# Patient Record
Sex: MALE | Race: WHITE | NOT HISPANIC OR LATINO | Employment: FULL TIME | RURAL
[De-identification: names, ages, dates, MRNs, and addresses within clinical notes are randomized per-mention and may not be internally consistent; named-entity substitution may affect disease eponyms.]

---

## 2021-05-25 ENCOUNTER — OFFICE VISIT (OUTPATIENT)
Dept: FAMILY MEDICINE | Facility: CLINIC | Age: 66
End: 2021-05-25
Payer: COMMERCIAL

## 2021-05-25 VITALS
BODY MASS INDEX: 29.73 KG/M2 | OXYGEN SATURATION: 98 % | SYSTOLIC BLOOD PRESSURE: 110 MMHG | TEMPERATURE: 97 F | HEART RATE: 68 BPM | HEIGHT: 63 IN | DIASTOLIC BLOOD PRESSURE: 72 MMHG | WEIGHT: 167.81 LBS | RESPIRATION RATE: 20 BRPM

## 2021-05-25 DIAGNOSIS — Z12.5 PROSTATE CANCER SCREENING: ICD-10-CM

## 2021-05-25 DIAGNOSIS — E11.65 TYPE 2 DIABETES MELLITUS WITH HYPERGLYCEMIA, WITHOUT LONG-TERM CURRENT USE OF INSULIN: Primary | ICD-10-CM

## 2021-05-25 DIAGNOSIS — Z23 NEED FOR DIPHTHERIA-TETANUS-PERTUSSIS (TDAP) VACCINE: ICD-10-CM

## 2021-05-25 DIAGNOSIS — Z79.899 ENCOUNTER FOR LONG-TERM (CURRENT) USE OF OTHER MEDICATIONS: ICD-10-CM

## 2021-05-25 DIAGNOSIS — I10 ESSENTIAL HYPERTENSION: ICD-10-CM

## 2021-05-25 DIAGNOSIS — Z11.59 NEED FOR HEPATITIS C SCREENING TEST: ICD-10-CM

## 2021-05-25 PROCEDURE — 90471 TDAP VACCINE GREATER THAN OR EQUAL TO 7YO IM: ICD-10-PCS | Mod: ,,, | Performed by: NURSE PRACTITIONER

## 2021-05-25 PROCEDURE — 90471 IMMUNIZATION ADMIN: CPT | Mod: ,,, | Performed by: NURSE PRACTITIONER

## 2021-05-25 PROCEDURE — 99214 PR OFFICE/OUTPT VISIT, EST, LEVL IV, 30-39 MIN: ICD-10-PCS | Mod: 25,,, | Performed by: NURSE PRACTITIONER

## 2021-05-25 PROCEDURE — 90715 TDAP VACCINE GREATER THAN OR EQUAL TO 7YO IM: ICD-10-PCS | Mod: ,,, | Performed by: NURSE PRACTITIONER

## 2021-05-25 PROCEDURE — 99214 OFFICE O/P EST MOD 30 MIN: CPT | Mod: 25,,, | Performed by: NURSE PRACTITIONER

## 2021-05-25 PROCEDURE — 90715 TDAP VACCINE 7 YRS/> IM: CPT | Mod: ,,, | Performed by: NURSE PRACTITIONER

## 2021-05-25 RX ORDER — LISINOPRIL 20 MG/1
20 TABLET ORAL DAILY
COMMUNITY
Start: 2021-05-11 | End: 2021-08-05

## 2021-05-25 RX ORDER — DAPAGLIFLOZIN AND METFORMIN HYDROCHLORIDE 5; 1000 MG/1; MG/1
2 TABLET, FILM COATED, EXTENDED RELEASE ORAL EVERY MORNING
COMMUNITY
Start: 2021-03-31 | End: 2021-05-25 | Stop reason: SDUPTHER

## 2021-05-25 RX ORDER — DAPAGLIFLOZIN AND METFORMIN HYDROCHLORIDE 5; 1000 MG/1; MG/1
2 TABLET, FILM COATED, EXTENDED RELEASE ORAL EVERY MORNING
Qty: 180 TABLET | Refills: 1 | Status: SHIPPED | OUTPATIENT
Start: 2021-05-25 | End: 2021-09-28

## 2021-07-20 ENCOUNTER — OFFICE VISIT (OUTPATIENT)
Dept: FAMILY MEDICINE | Facility: CLINIC | Age: 66
End: 2021-07-20
Payer: COMMERCIAL

## 2021-07-20 VITALS
RESPIRATION RATE: 20 BRPM | HEART RATE: 80 BPM | TEMPERATURE: 98 F | WEIGHT: 173.81 LBS | OXYGEN SATURATION: 98 % | DIASTOLIC BLOOD PRESSURE: 74 MMHG | HEIGHT: 63 IN | BODY MASS INDEX: 30.8 KG/M2 | SYSTOLIC BLOOD PRESSURE: 112 MMHG

## 2021-07-20 DIAGNOSIS — R07.81 RIB PAIN ON LEFT SIDE: Primary | ICD-10-CM

## 2021-07-20 DIAGNOSIS — S20.20XA: ICD-10-CM

## 2021-07-20 PROCEDURE — 99212 PR OFFICE/OUTPT VISIT, EST, LEVL II, 10-19 MIN: ICD-10-PCS | Mod: ,,, | Performed by: NURSE PRACTITIONER

## 2021-07-20 PROCEDURE — 99212 OFFICE O/P EST SF 10 MIN: CPT | Mod: ,,, | Performed by: NURSE PRACTITIONER

## 2021-08-20 ENCOUNTER — OFFICE VISIT (OUTPATIENT)
Dept: FAMILY MEDICINE | Facility: CLINIC | Age: 66
End: 2021-08-20
Payer: COMMERCIAL

## 2021-08-20 VITALS — HEIGHT: 63 IN | BODY MASS INDEX: 30.65 KG/M2 | WEIGHT: 173 LBS | HEART RATE: 94 BPM | OXYGEN SATURATION: 96 %

## 2021-08-20 DIAGNOSIS — Z20.822 EXPOSURE TO COVID-19 VIRUS: ICD-10-CM

## 2021-08-20 DIAGNOSIS — R63.0 LOSS OF APPETITE: ICD-10-CM

## 2021-08-20 DIAGNOSIS — J02.9 SORE THROAT: ICD-10-CM

## 2021-08-20 DIAGNOSIS — R09.81 NASAL CONGESTION: ICD-10-CM

## 2021-08-20 DIAGNOSIS — R51.9 NONINTRACTABLE HEADACHE, UNSPECIFIED CHRONICITY PATTERN, UNSPECIFIED HEADACHE TYPE: ICD-10-CM

## 2021-08-20 DIAGNOSIS — R05.9 COUGH: ICD-10-CM

## 2021-08-20 DIAGNOSIS — U07.1 COVID-19: Primary | ICD-10-CM

## 2021-08-20 DIAGNOSIS — R52 BODY ACHES: ICD-10-CM

## 2021-08-20 DIAGNOSIS — R68.83 CHILLS: ICD-10-CM

## 2021-08-20 LAB
CTP QC/QA: YES
CTP QC/QA: YES
S PYO RRNA THROAT QL PROBE: NEGATIVE
SARS-COV-2 AG RESP QL IA.RAPID: POSITIVE

## 2021-08-20 PROCEDURE — 3008F PR BODY MASS INDEX (BMI) DOCUMENTED: ICD-10-PCS | Mod: ,,, | Performed by: NURSE PRACTITIONER

## 2021-08-20 PROCEDURE — 99213 PR OFFICE/OUTPT VISIT, EST, LEVL III, 20-29 MIN: ICD-10-PCS | Mod: ,,, | Performed by: NURSE PRACTITIONER

## 2021-08-20 PROCEDURE — 3044F PR MOST RECENT HEMOGLOBIN A1C LEVEL <7.0%: ICD-10-PCS | Mod: ,,, | Performed by: NURSE PRACTITIONER

## 2021-08-20 PROCEDURE — 1159F PR MEDICATION LIST DOCUMENTED IN MEDICAL RECORD: ICD-10-PCS | Mod: ,,, | Performed by: NURSE PRACTITIONER

## 2021-08-20 PROCEDURE — 1159F MED LIST DOCD IN RCRD: CPT | Mod: ,,, | Performed by: NURSE PRACTITIONER

## 2021-08-20 PROCEDURE — 87880 STREP A ASSAY W/OPTIC: CPT | Mod: QW,,, | Performed by: NURSE PRACTITIONER

## 2021-08-20 PROCEDURE — 3044F HG A1C LEVEL LT 7.0%: CPT | Mod: ,,, | Performed by: NURSE PRACTITIONER

## 2021-08-20 PROCEDURE — 99213 OFFICE O/P EST LOW 20 MIN: CPT | Mod: ,,, | Performed by: NURSE PRACTITIONER

## 2021-08-20 PROCEDURE — 87880 POCT RAPID STREP A: ICD-10-PCS | Mod: QW,,, | Performed by: NURSE PRACTITIONER

## 2021-08-20 PROCEDURE — 1160F RVW MEDS BY RX/DR IN RCRD: CPT | Mod: ,,, | Performed by: NURSE PRACTITIONER

## 2021-08-20 PROCEDURE — 87426 SARSCOV CORONAVIRUS AG IA: CPT | Mod: QW,,, | Performed by: NURSE PRACTITIONER

## 2021-08-20 PROCEDURE — 87426 SARS CORONAVIRUS 2 ANTIGEN POCT: ICD-10-PCS | Mod: QW,,, | Performed by: NURSE PRACTITIONER

## 2021-08-20 PROCEDURE — 1160F PR REVIEW ALL MEDS BY PRESCRIBER/CLIN PHARMACIST DOCUMENTED: ICD-10-PCS | Mod: ,,, | Performed by: NURSE PRACTITIONER

## 2021-08-20 PROCEDURE — 3008F BODY MASS INDEX DOCD: CPT | Mod: ,,, | Performed by: NURSE PRACTITIONER

## 2021-08-21 ENCOUNTER — INFUSION (OUTPATIENT)
Dept: INFECTIOUS DISEASES | Facility: HOSPITAL | Age: 66
End: 2021-08-21
Attending: PSYCHOLOGIST
Payer: COMMERCIAL

## 2021-08-21 VITALS
TEMPERATURE: 98 F | SYSTOLIC BLOOD PRESSURE: 114 MMHG | OXYGEN SATURATION: 97 % | RESPIRATION RATE: 16 BRPM | DIASTOLIC BLOOD PRESSURE: 75 MMHG | HEART RATE: 97 BPM

## 2021-08-21 DIAGNOSIS — U07.1 COVID-19: Primary | ICD-10-CM

## 2021-08-21 PROCEDURE — 63600175 PHARM REV CODE 636 W HCPCS: Performed by: NURSE PRACTITIONER

## 2021-08-21 PROCEDURE — M0243 CASIRIVI AND IMDEVI INFUSION: HCPCS | Performed by: NURSE PRACTITIONER

## 2021-08-21 PROCEDURE — 25000003 PHARM REV CODE 250: Performed by: NURSE PRACTITIONER

## 2021-08-21 RX ORDER — SODIUM CHLORIDE 0.9 % (FLUSH) 0.9 %
10 SYRINGE (ML) INJECTION
Status: DISCONTINUED | OUTPATIENT
Start: 2021-08-21 | End: 2021-09-28 | Stop reason: ALTCHOICE

## 2021-08-21 RX ORDER — DIPHENHYDRAMINE HYDROCHLORIDE 50 MG/ML
25 INJECTION INTRAMUSCULAR; INTRAVENOUS ONCE AS NEEDED
Status: DISCONTINUED | OUTPATIENT
Start: 2021-08-21 | End: 2021-09-28 | Stop reason: ALTCHOICE

## 2021-08-21 RX ORDER — ALBUTEROL SULFATE 90 UG/1
2 AEROSOL, METERED RESPIRATORY (INHALATION)
Status: DISCONTINUED | OUTPATIENT
Start: 2021-08-21 | End: 2021-09-28 | Stop reason: ALTCHOICE

## 2021-08-21 RX ORDER — ACETAMINOPHEN 325 MG/1
650 TABLET ORAL ONCE AS NEEDED
Status: DISCONTINUED | OUTPATIENT
Start: 2021-08-21 | End: 2021-09-28 | Stop reason: ALTCHOICE

## 2021-08-21 RX ORDER — ONDANSETRON 4 MG/1
4 TABLET, ORALLY DISINTEGRATING ORAL ONCE AS NEEDED
Status: DISCONTINUED | OUTPATIENT
Start: 2021-08-21 | End: 2021-09-28 | Stop reason: ALTCHOICE

## 2021-08-21 RX ORDER — EPINEPHRINE 0.3 MG/.3ML
0.3 INJECTION SUBCUTANEOUS
Status: DISCONTINUED | OUTPATIENT
Start: 2021-08-21 | End: 2021-09-28 | Stop reason: ALTCHOICE

## 2021-08-21 RX ADMIN — CASIRIVIMAB AND IMDEVIMAB 600 MG: 600; 600 INJECTION, SOLUTION, CONCENTRATE INTRAVENOUS at 09:08

## 2021-08-24 ENCOUNTER — TELEPHONE (OUTPATIENT)
Dept: FAMILY MEDICINE | Facility: CLINIC | Age: 66
End: 2021-08-24

## 2021-09-28 ENCOUNTER — OFFICE VISIT (OUTPATIENT)
Dept: FAMILY MEDICINE | Facility: CLINIC | Age: 66
End: 2021-09-28
Payer: COMMERCIAL

## 2021-09-28 ENCOUNTER — TELEPHONE (OUTPATIENT)
Dept: FAMILY MEDICINE | Facility: CLINIC | Age: 66
End: 2021-09-28

## 2021-09-28 VITALS
OXYGEN SATURATION: 97 % | TEMPERATURE: 97 F | HEIGHT: 63 IN | BODY MASS INDEX: 29.66 KG/M2 | DIASTOLIC BLOOD PRESSURE: 70 MMHG | RESPIRATION RATE: 20 BRPM | HEART RATE: 77 BPM | SYSTOLIC BLOOD PRESSURE: 110 MMHG | WEIGHT: 167.38 LBS

## 2021-09-28 DIAGNOSIS — I10 ESSENTIAL HYPERTENSION: ICD-10-CM

## 2021-09-28 DIAGNOSIS — E11.65 TYPE 2 DIABETES MELLITUS WITH HYPERGLYCEMIA, WITHOUT LONG-TERM CURRENT USE OF INSULIN: ICD-10-CM

## 2021-09-28 DIAGNOSIS — Z13.1 DIABETES MELLITUS SCREENING: ICD-10-CM

## 2021-09-28 DIAGNOSIS — Z13.220 SCREENING FOR HYPERLIPIDEMIA: ICD-10-CM

## 2021-09-28 DIAGNOSIS — Z86.16 PERSONAL HISTORY OF COVID-19: ICD-10-CM

## 2021-09-28 DIAGNOSIS — Z79.899 ENCOUNTER FOR LONG-TERM (CURRENT) USE OF OTHER MEDICATIONS: ICD-10-CM

## 2021-09-28 DIAGNOSIS — Z00.00 ROUTINE GENERAL MEDICAL EXAMINATION AT A HEALTH CARE FACILITY: Primary | ICD-10-CM

## 2021-09-28 LAB
ANION GAP SERPL CALCULATED.3IONS-SCNC: 13 MMOL/L (ref 7–16)
BUN SERPL-MCNC: 27 MG/DL (ref 7–18)
BUN/CREAT SERPL: 22 (ref 6–20)
CALCIUM SERPL-MCNC: 9.7 MG/DL (ref 8.5–10.1)
CHLORIDE SERPL-SCNC: 108 MMOL/L (ref 98–107)
CHOLEST SERPL-MCNC: 144 MG/DL (ref 0–200)
CHOLEST/HDLC SERPL: 3.3 {RATIO}
CO2 SERPL-SCNC: 22 MMOL/L (ref 21–32)
CREAT SERPL-MCNC: 1.25 MG/DL (ref 0.7–1.3)
EST. AVERAGE GLUCOSE BLD GHB EST-MCNC: 134 MG/DL
GLUCOSE SERPL-MCNC: 105 MG/DL (ref 74–106)
HBA1C MFR BLD HPLC: 6.6 % (ref 4.5–6.6)
HDLC SERPL-MCNC: 43 MG/DL (ref 40–60)
LDLC SERPL CALC-MCNC: 83 MG/DL
LDLC/HDLC SERPL: 1.9 {RATIO}
NONHDLC SERPL-MCNC: 101 MG/DL
POTASSIUM SERPL-SCNC: 4 MMOL/L (ref 3.5–5.1)
SODIUM SERPL-SCNC: 139 MMOL/L (ref 136–145)
TRIGL SERPL-MCNC: 90 MG/DL (ref 35–150)
VLDLC SERPL-MCNC: 18 MG/DL

## 2021-09-28 PROCEDURE — 80061 LIPID PANEL: ICD-10-PCS | Mod: ,,, | Performed by: CLINICAL MEDICAL LABORATORY

## 2021-09-28 PROCEDURE — 3074F PR MOST RECENT SYSTOLIC BLOOD PRESSURE < 130 MM HG: ICD-10-PCS | Mod: ,,, | Performed by: NURSE PRACTITIONER

## 2021-09-28 PROCEDURE — 80048 BASIC METABOLIC PNL TOTAL CA: CPT | Mod: ,,, | Performed by: CLINICAL MEDICAL LABORATORY

## 2021-09-28 PROCEDURE — 3074F SYST BP LT 130 MM HG: CPT | Mod: ,,, | Performed by: NURSE PRACTITIONER

## 2021-09-28 PROCEDURE — 99396 PR PREVENTIVE VISIT,EST,40-64: ICD-10-PCS | Mod: ,,, | Performed by: NURSE PRACTITIONER

## 2021-09-28 PROCEDURE — 80061 LIPID PANEL: CPT | Mod: ,,, | Performed by: CLINICAL MEDICAL LABORATORY

## 2021-09-28 PROCEDURE — 3008F BODY MASS INDEX DOCD: CPT | Mod: ,,, | Performed by: NURSE PRACTITIONER

## 2021-09-28 PROCEDURE — 3061F PR NEG MICROALBUMINURIA RESULT DOCUMENTED/REVIEW: ICD-10-PCS | Mod: ,,, | Performed by: NURSE PRACTITIONER

## 2021-09-28 PROCEDURE — 83036 HEMOGLOBIN GLYCOSYLATED A1C: CPT | Mod: ,,, | Performed by: CLINICAL MEDICAL LABORATORY

## 2021-09-28 PROCEDURE — 3044F PR MOST RECENT HEMOGLOBIN A1C LEVEL <7.0%: ICD-10-PCS | Mod: ,,, | Performed by: NURSE PRACTITIONER

## 2021-09-28 PROCEDURE — 3066F NEPHROPATHY DOC TX: CPT | Mod: ,,, | Performed by: NURSE PRACTITIONER

## 2021-09-28 PROCEDURE — 3066F PR DOCUMENTATION OF TREATMENT FOR NEPHROPATHY: ICD-10-PCS | Mod: ,,, | Performed by: NURSE PRACTITIONER

## 2021-09-28 PROCEDURE — 80048 BASIC METABOLIC PANEL: ICD-10-PCS | Mod: ,,, | Performed by: CLINICAL MEDICAL LABORATORY

## 2021-09-28 PROCEDURE — 3044F HG A1C LEVEL LT 7.0%: CPT | Mod: ,,, | Performed by: NURSE PRACTITIONER

## 2021-09-28 PROCEDURE — 3008F PR BODY MASS INDEX (BMI) DOCUMENTED: ICD-10-PCS | Mod: ,,, | Performed by: NURSE PRACTITIONER

## 2021-09-28 PROCEDURE — 3061F NEG MICROALBUMINURIA REV: CPT | Mod: ,,, | Performed by: NURSE PRACTITIONER

## 2021-09-28 PROCEDURE — 1159F MED LIST DOCD IN RCRD: CPT | Mod: ,,, | Performed by: NURSE PRACTITIONER

## 2021-09-28 PROCEDURE — 4010F PR ACE/ARB THEARPY RXD/TAKEN: ICD-10-PCS | Mod: ,,, | Performed by: NURSE PRACTITIONER

## 2021-09-28 PROCEDURE — 3078F DIAST BP <80 MM HG: CPT | Mod: ,,, | Performed by: NURSE PRACTITIONER

## 2021-09-28 PROCEDURE — 99396 PREV VISIT EST AGE 40-64: CPT | Mod: ,,, | Performed by: NURSE PRACTITIONER

## 2021-09-28 PROCEDURE — 3078F PR MOST RECENT DIASTOLIC BLOOD PRESSURE < 80 MM HG: ICD-10-PCS | Mod: ,,, | Performed by: NURSE PRACTITIONER

## 2021-09-28 PROCEDURE — 1159F PR MEDICATION LIST DOCUMENTED IN MEDICAL RECORD: ICD-10-PCS | Mod: ,,, | Performed by: NURSE PRACTITIONER

## 2021-09-28 PROCEDURE — 4010F ACE/ARB THERAPY RXD/TAKEN: CPT | Mod: ,,, | Performed by: NURSE PRACTITIONER

## 2021-09-28 PROCEDURE — 83036 HEMOGLOBIN A1C: ICD-10-PCS | Mod: ,,, | Performed by: CLINICAL MEDICAL LABORATORY

## 2021-09-28 RX ORDER — PRAVASTATIN SODIUM 10 MG/1
10 TABLET ORAL DAILY
Qty: 90 TABLET | Refills: 3 | Status: SHIPPED | OUTPATIENT
Start: 2021-09-28 | End: 2022-05-24 | Stop reason: SDUPTHER

## 2021-09-28 RX ORDER — LISINOPRIL 20 MG/1
20 TABLET ORAL DAILY
Qty: 90 TABLET | Refills: 1 | Status: SHIPPED | OUTPATIENT
Start: 2021-09-28 | End: 2022-02-16

## 2021-09-28 RX ORDER — EMPAGLIFLOZIN, METFORMIN HYDROCHLORIDE 12.5; 1 MG/1; MG/1
2 TABLET, EXTENDED RELEASE ORAL DAILY
Qty: 180 TABLET | Refills: 1 | Status: SHIPPED | OUTPATIENT
Start: 2021-09-28 | End: 2021-09-29

## 2021-09-28 RX ORDER — DAPAGLIFLOZIN AND METFORMIN HYDROCHLORIDE 5; 1000 MG/1; MG/1
2 TABLET, FILM COATED, EXTENDED RELEASE ORAL EVERY MORNING
Qty: 180 TABLET | Refills: 1 | Status: CANCELLED | OUTPATIENT
Start: 2021-09-28

## 2021-09-29 ENCOUNTER — TELEPHONE (OUTPATIENT)
Dept: FAMILY MEDICINE | Facility: CLINIC | Age: 66
End: 2021-09-29

## 2021-09-29 DIAGNOSIS — E11.65 TYPE 2 DIABETES MELLITUS WITH HYPERGLYCEMIA, WITHOUT LONG-TERM CURRENT USE OF INSULIN: Primary | ICD-10-CM

## 2021-09-29 RX ORDER — METFORMIN HYDROCHLORIDE 500 MG/1
2000 TABLET, EXTENDED RELEASE ORAL
Qty: 360 TABLET | Refills: 3 | Status: SHIPPED | OUTPATIENT
Start: 2021-09-29 | End: 2022-04-30 | Stop reason: CLARIF

## 2021-09-29 RX ORDER — EMPAGLIFLOZIN 25 MG/1
25 TABLET, FILM COATED ORAL DAILY
Qty: 90 TABLET | Refills: 3 | Status: SHIPPED | OUTPATIENT
Start: 2021-09-29 | End: 2022-04-30 | Stop reason: CLARIF

## 2022-01-11 ENCOUNTER — OFFICE VISIT (OUTPATIENT)
Dept: FAMILY MEDICINE | Facility: CLINIC | Age: 67
End: 2022-01-11
Payer: COMMERCIAL

## 2022-01-11 VITALS
TEMPERATURE: 97 F | SYSTOLIC BLOOD PRESSURE: 120 MMHG | HEART RATE: 97 BPM | BODY MASS INDEX: 31.01 KG/M2 | RESPIRATION RATE: 20 BRPM | OXYGEN SATURATION: 99 % | WEIGHT: 175 LBS | DIASTOLIC BLOOD PRESSURE: 78 MMHG | HEIGHT: 63 IN

## 2022-01-11 DIAGNOSIS — Z23 NEED FOR PNEUMOCOCCAL VACCINE: ICD-10-CM

## 2022-01-11 DIAGNOSIS — Z79.899 ENCOUNTER FOR LONG-TERM (CURRENT) USE OF OTHER MEDICATIONS: ICD-10-CM

## 2022-01-11 DIAGNOSIS — E11.65 TYPE 2 DIABETES MELLITUS WITH HYPERGLYCEMIA, WITHOUT LONG-TERM CURRENT USE OF INSULIN: Primary | ICD-10-CM

## 2022-01-11 DIAGNOSIS — I10 PRIMARY HYPERTENSION: ICD-10-CM

## 2022-01-11 LAB
ANION GAP SERPL CALCULATED.3IONS-SCNC: 10 MMOL/L (ref 7–16)
BUN SERPL-MCNC: 21 MG/DL (ref 7–18)
BUN/CREAT SERPL: 18 (ref 6–20)
CALCIUM SERPL-MCNC: 9 MG/DL (ref 8.5–10.1)
CHLORIDE SERPL-SCNC: 106 MMOL/L (ref 98–107)
CO2 SERPL-SCNC: 24 MMOL/L (ref 21–32)
CREAT SERPL-MCNC: 1.2 MG/DL (ref 0.7–1.3)
EST. AVERAGE GLUCOSE BLD GHB EST-MCNC: 194 MG/DL
GLUCOSE SERPL-MCNC: 209 MG/DL (ref 74–106)
HBA1C MFR BLD HPLC: 8.4 % (ref 4.5–6.6)
POTASSIUM SERPL-SCNC: 4.2 MMOL/L (ref 3.5–5.1)
SODIUM SERPL-SCNC: 136 MMOL/L (ref 136–145)

## 2022-01-11 PROCEDURE — 3074F SYST BP LT 130 MM HG: CPT | Mod: ,,, | Performed by: NURSE PRACTITIONER

## 2022-01-11 PROCEDURE — 3074F PR MOST RECENT SYSTOLIC BLOOD PRESSURE < 130 MM HG: ICD-10-PCS | Mod: ,,, | Performed by: NURSE PRACTITIONER

## 2022-01-11 PROCEDURE — 90471 IMMUNIZATION ADMIN: CPT | Mod: ,,, | Performed by: NURSE PRACTITIONER

## 2022-01-11 PROCEDURE — 1126F PR PAIN SEVERITY QUANTIFIED, NO PAIN PRESENT: ICD-10-PCS | Mod: ,,, | Performed by: NURSE PRACTITIONER

## 2022-01-11 PROCEDURE — 3008F BODY MASS INDEX DOCD: CPT | Mod: ,,, | Performed by: NURSE PRACTITIONER

## 2022-01-11 PROCEDURE — 1159F MED LIST DOCD IN RCRD: CPT | Mod: ,,, | Performed by: NURSE PRACTITIONER

## 2022-01-11 PROCEDURE — 83036 HEMOGLOBIN A1C: ICD-10-PCS | Mod: ,,, | Performed by: CLINICAL MEDICAL LABORATORY

## 2022-01-11 PROCEDURE — 80048 BASIC METABOLIC PNL TOTAL CA: CPT | Mod: ,,, | Performed by: CLINICAL MEDICAL LABORATORY

## 2022-01-11 PROCEDURE — 1126F AMNT PAIN NOTED NONE PRSNT: CPT | Mod: ,,, | Performed by: NURSE PRACTITIONER

## 2022-01-11 PROCEDURE — 90471 PNEUMOCOCCAL POLYSACCHARIDE VACCINE 23-VALENT =>2YO SQ IM: ICD-10-PCS | Mod: ,,, | Performed by: NURSE PRACTITIONER

## 2022-01-11 PROCEDURE — 99213 PR OFFICE/OUTPT VISIT, EST, LEVL III, 20-29 MIN: ICD-10-PCS | Mod: 25,,, | Performed by: NURSE PRACTITIONER

## 2022-01-11 PROCEDURE — 90732 PPSV23 VACC 2 YRS+ SUBQ/IM: CPT | Mod: ,,, | Performed by: NURSE PRACTITIONER

## 2022-01-11 PROCEDURE — 90732 PNEUMOCOCCAL POLYSACCHARIDE VACCINE 23-VALENT =>2YO SQ IM: ICD-10-PCS | Mod: ,,, | Performed by: NURSE PRACTITIONER

## 2022-01-11 PROCEDURE — 1159F PR MEDICATION LIST DOCUMENTED IN MEDICAL RECORD: ICD-10-PCS | Mod: ,,, | Performed by: NURSE PRACTITIONER

## 2022-01-11 PROCEDURE — 1101F PR PT FALLS ASSESS DOC 0-1 FALLS W/OUT INJ PAST YR: ICD-10-PCS | Mod: ,,, | Performed by: NURSE PRACTITIONER

## 2022-01-11 PROCEDURE — 99213 OFFICE O/P EST LOW 20 MIN: CPT | Mod: 25,,, | Performed by: NURSE PRACTITIONER

## 2022-01-11 PROCEDURE — 3078F PR MOST RECENT DIASTOLIC BLOOD PRESSURE < 80 MM HG: ICD-10-PCS | Mod: ,,, | Performed by: NURSE PRACTITIONER

## 2022-01-11 PROCEDURE — 1160F RVW MEDS BY RX/DR IN RCRD: CPT | Mod: ,,, | Performed by: NURSE PRACTITIONER

## 2022-01-11 PROCEDURE — 3288F PR FALLS RISK ASSESSMENT DOCUMENTED: ICD-10-PCS | Mod: ,,, | Performed by: NURSE PRACTITIONER

## 2022-01-11 PROCEDURE — 3078F DIAST BP <80 MM HG: CPT | Mod: ,,, | Performed by: NURSE PRACTITIONER

## 2022-01-11 PROCEDURE — 1101F PT FALLS ASSESS-DOCD LE1/YR: CPT | Mod: ,,, | Performed by: NURSE PRACTITIONER

## 2022-01-11 PROCEDURE — 83036 HEMOGLOBIN GLYCOSYLATED A1C: CPT | Mod: ,,, | Performed by: CLINICAL MEDICAL LABORATORY

## 2022-01-11 PROCEDURE — 1160F PR REVIEW ALL MEDS BY PRESCRIBER/CLIN PHARMACIST DOCUMENTED: ICD-10-PCS | Mod: ,,, | Performed by: NURSE PRACTITIONER

## 2022-01-11 PROCEDURE — 80048 BASIC METABOLIC PANEL: ICD-10-PCS | Mod: ,,, | Performed by: CLINICAL MEDICAL LABORATORY

## 2022-01-11 PROCEDURE — 3288F FALL RISK ASSESSMENT DOCD: CPT | Mod: ,,, | Performed by: NURSE PRACTITIONER

## 2022-01-11 PROCEDURE — 3008F PR BODY MASS INDEX (BMI) DOCUMENTED: ICD-10-PCS | Mod: ,,, | Performed by: NURSE PRACTITIONER

## 2022-01-11 RX ORDER — DAPAGLIFLOZIN AND METFORMIN HYDROCHLORIDE 5; 1000 MG/1; MG/1
2 TABLET, FILM COATED, EXTENDED RELEASE ORAL EVERY MORNING
COMMUNITY
Start: 2021-09-29 | End: 2022-01-11

## 2022-01-11 NOTE — PROGRESS NOTES
[facility] - FAMILY MEDICINE       PATIENT NAME: Vishal Grace   : 1955    AGE: 66 y.o. DATE: 2022    MRN: [id number]        Reason for Visit / Chief Complaint:  Follow-up (Pt presents for HTN and DM follow up. He is fasting.), Hypertension, and Diabetes     Subjective:     HPI:  Presents for f/u T2DM and HTN.  Employer closed suddenly [employer]). Trying to get another job.    Monitoring glucose rarely. 2 days ago 160s around lunch.    Last A1c 6.6% 2021  Medications: metformin 2000 mg daily; jardiance 25 mg - doesn't take it every day    PHQ9 2022   Total Score 0     Review of Systems:     Review of Systems   Constitutional: Negative.    HENT: Negative.    Eyes: Negative.    Respiratory: Negative.    Cardiovascular: Negative.    Gastrointestinal: Negative.    Endocrine: Negative.    Genitourinary: Negative.    Musculoskeletal: Negative.    Skin: Negative.    Allergic/Immunologic: Negative.    Neurological: Negative.    Hematological: Negative.    Psychiatric/Behavioral: Negative.        Allergies and Medications:     Review of patient's allergies indicates:   Allergen Reactions    Pineapple Hives        Current Outpatient Medications on File Prior to Visit   Medication Sig Dispense Refill    empagliflozin (JARDIANCE) 25 mg tablet Take 1 tablet (25 mg total) by mouth once daily. 90 tablet 3    lisinopriL (PRINIVIL,ZESTRIL) 20 MG tablet Take 1 tablet (20 mg total) by mouth once daily. 90 tablet 1    metFORMIN (GLUCOPHAGE-XR) 500 MG ER 24hr tablet Take 4 tablets (2,000 mg total) by mouth daily with dinner or evening meal. 360 tablet 3    pravastatin (PRAVACHOL) 10 MG tablet Take 1 tablet (10 mg total) by mouth once daily. 90 tablet 3    [DISCONTINUED] XIGDUO XR 5-1,000 mg TBph Take 2 tablets by mouth every morning.       No current facility-administered medications on file prior to visit.       Labs:      Lab Results    Component Value Date    HGBA1C 6.6 09/28/2021      Lipids:   Lab Results   Component Value Date    CHOL 144 09/28/2021    CHOL 172 05/25/2021     Lab Results   Component Value Date    HDL 43 09/28/2021    HDL 44 05/25/2021     Lab Results   Component Value Date    LDLCALC 83 09/28/2021    LDLCALC 103 05/25/2021     Lab Results   Component Value Date    TRIG 90 09/28/2021    TRIG 124 05/25/2021     Lab Results   Component Value Date    CHOLHDL 3.3 09/28/2021    CHOLHDL 3.9 05/25/2021      Urine Ma/creat ratio:  Lab Results   Component Value Date    MICROALBUR 0.7 05/25/2021      CMP:  Sodium   Date Value Ref Range Status   09/28/2021 139 136 - 145 mmol/L Final     Potassium   Date Value Ref Range Status   09/28/2021 4.0 3.5 - 5.1 mmol/L Final     Chloride   Date Value Ref Range Status   09/28/2021 108 (H) 98 - 107 mmol/L Final     CO2   Date Value Ref Range Status   09/28/2021 22 21 - 32 mmol/L Final     Glucose   Date Value Ref Range Status   09/28/2021 105 74 - 106 mg/dL Final     BUN   Date Value Ref Range Status   09/28/2021 27 (H) 7 - 18 mg/dL Final     Creatinine   Date Value Ref Range Status   09/28/2021 1.25 0.70 - 1.30 mg/dL Final     Calcium   Date Value Ref Range Status   09/28/2021 9.7 8.5 - 10.1 mg/dL Final     Total Protein   Date Value Ref Range Status   05/25/2021 7.9 6.4 - 8.2 g/dL Final     Albumin   Date Value Ref Range Status   05/25/2021 4.3 3.5 - 5.0 g/dL Final     Bilirubin, Total   Date Value Ref Range Status   05/25/2021 0.6 >0.0 - 1.2 mg/dL Final     Alk Phos   Date Value Ref Range Status   05/25/2021 76 45 - 115 U/L Final     AST   Date Value Ref Range Status   05/25/2021 9 (L) 15 - 37 U/L Final     ALT   Date Value Ref Range Status   05/25/2021 31 16 - 61 U/L Final     Anion Gap   Date Value Ref Range Status   09/28/2021 13 7 - 16 mmol/L Final     eGFR   Date Value Ref Range Status   09/28/2021 62 >=60 mL/min/1.73m² Final      CBC:  Lab Results   Component Value Date    WBC 7.57  05/25/2021    RBC 5.51 05/25/2021    HGB 17.0 05/25/2021    HCT 50.9 05/25/2021    MCV 92.4 05/25/2021    MCH 30.9 05/25/2021    MCHC 33.4 05/25/2021    RDW 13.3 05/25/2021     05/25/2021    MPV 9.9 05/25/2021    LYMPH 17.4 (L) 05/25/2021    LYMPH 1.32 05/25/2021    MONO 7.7 (H) 05/25/2021    EOS 0.30 05/25/2021    BASO 0.03 05/25/2021    EOSINOPHIL 4.0 05/25/2021    BASOPHIL 0.4 05/25/2021      Lab Results   Component Value Date    TSH 2.760 05/25/2021         Medical/Social/Family History:     Past Medical History:   Diagnosis Date    COVID-19 08/20/2021    Deficiency of testosterone biosynthesis     Diabetes mellitus, type 2     Hypertension     Long term (current) use of oral hypoglycemic drugs     Other long term (current) drug therapy     Personal history of malignant melanoma of skin 1998    Personal history of malignant neoplasm of other parts of nervous tissue 04/2015    Referred to Dr. 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; had re-excision of the area 6/4/15 without residual tumor noted; had 30 radiation treatments between 8/4/15-9/15/2015      Social History     Tobacco Use   Smoking Status Never Smoker   Smokeless Tobacco Never Used      Social History     Substance and Sexual Activity   Alcohol Use Not Currently       Family History   Problem Relation Age of Onset    Stroke Mother     Heart disease Father         heart failure    No Known Problems Son       Past Surgical History:   Procedure Laterality Date    COLONOSCOPY      EXCISION OF MELANOMA  1998        Health Maintenance:     Immunization History   Administered Date(s) Administered    Pneumococcal Polysaccharide - 23 Valent 01/16/2017, 01/11/2022    Tdap 05/25/2021      Health Maintenance Due   Topic Date Due    Shingles Vaccine (1 of 2) Never done    Eye Exam  06/10/2020     Health Maintenance Topics with due  "status: Not Due       Topic Last Completion Date    Colorectal Cancer Screening 02/04/2013    PROSTATE-SPECIFIC ANTIGEN 05/25/2021    TETANUS VACCINE 05/25/2021    Diabetes Urine Screening 05/25/2021    Foot Exam 09/28/2021    Lipid Panel 09/28/2021    Hemoglobin A1c 09/28/2021    Low Dose Statin 01/11/2022       Objective:      Vitals:    01/11/22 1041   BP: 120/78   BP Location: Right arm   Patient Position: Sitting   BP Method: Large (Manual)   Pulse: 97   Resp: 20   Temp: 97.4 °F (36.3 °C)   TempSrc: Temporal   SpO2: 99%   Weight: 79.4 kg (175 lb)   Height: 5' 3" (1.6 m)     Body mass index is 31 kg/m².     Physical Exam:    Physical Exam  Vitals and nursing note reviewed.   Constitutional:       Appearance: Normal appearance.   HENT:      Head: Normocephalic.      Right Ear: Tympanic membrane, ear canal and external ear normal.      Left Ear: Tympanic membrane, ear canal and external ear normal.      Nose: Nose normal.      Mouth/Throat:      Mouth: Mucous membranes are moist.      Pharynx: Oropharynx is clear.   Eyes:      Conjunctiva/sclera: Conjunctivae normal.      Pupils: Pupils are equal, round, and reactive to light.   Neck:      Thyroid: No thyromegaly.      Vascular: Normal carotid pulses. No carotid bruit.   Cardiovascular:      Rate and Rhythm: Normal rate and regular rhythm.      Pulses: Normal pulses.      Heart sounds: Normal heart sounds.   Pulmonary:      Effort: Pulmonary effort is normal.      Breath sounds: Normal breath sounds.   Abdominal:      General: Bowel sounds are normal.      Palpations: Abdomen is soft.      Tenderness: There is no abdominal tenderness.   Musculoskeletal:      Cervical back: Neck supple.      Right lower leg: No edema.      Left lower leg: No edema.   Lymphadenopathy:      Cervical: No cervical adenopathy.   Skin:     General: Skin is warm and dry.      Capillary Refill: Capillary refill takes less than 2 seconds.   Neurological:      General: No focal deficit " present.      Mental Status: He is alert and oriented to person, place, and time.   Psychiatric:         Mood and Affect: Mood normal.         Behavior: Behavior normal.          Assessment:          ICD-10-CM ICD-9-CM   1. Type 2 diabetes mellitus with hyperglycemia, without long-term current use of insulin  E11.65 250.00     790.29   2. Primary hypertension  I10 401.9   3. Encounter for long-term (current) use of other medications  Z79.899 V58.69   4. Need for pneumococcal vaccine  Z23 V03.82        Plan:       Type 2 diabetes mellitus with hyperglycemia, without long-term current use of insulin  -     Pneumococcal Polysaccharide Vaccine (23 Valent) (SQ/IM)  -     Hemoglobin A1C; Future; Expected date: 01/11/2022  -     Basic Metabolic Panel; Future; Expected date: 01/11/2022    Primary hypertension    Encounter for long-term (current) use of other medications  -     Basic Metabolic Panel; Future; Expected date: 01/11/2022    Need for pneumococcal vaccine  -     Pneumococcal Polysaccharide Vaccine (23 Valent) (SQ/IM)        Current Outpatient Medications:     empagliflozin (JARDIANCE) 25 mg tablet, Take 1 tablet (25 mg total) by mouth once daily., Disp: 90 tablet, Rfl: 3    lisinopriL (PRINIVIL,ZESTRIL) 20 MG tablet, Take 1 tablet (20 mg total) by mouth once daily., Disp: 90 tablet, Rfl: 1    metFORMIN (GLUCOPHAGE-XR) 500 MG ER 24hr tablet, Take 4 tablets (2,000 mg total) by mouth daily with dinner or evening meal., Disp: 360 tablet, Rfl: 3    pravastatin (PRAVACHOL) 10 MG tablet, Take 1 tablet (10 mg total) by mouth once daily., Disp: 90 tablet, Rfl: 3      New & refilled meds:  Requested Prescriptions      No prescriptions requested or ordered in this encounter        Current treatment plan is effective, no change in therapy.   Reviewed diet, exercise and weight control.   Labs - will notify   Pneumovax today    Return to clinic in 4 months for T2DM and HTN, nonfasting labs; and as needed.    Future  Appointments   Date Time Provider Department Center   5/12/2022  2:20 PM [facility]   9/28/2022  7:40 AM ANABELLA Cruz [facility] ABD[facility]        [facility]  [facility]    Date of encounter: 1/11/22

## 2022-01-11 NOTE — PATIENT INSTRUCTIONS
Patient Education       DASH Diet   About this topic   DASH stands for Dietary Approaches to Stop Hypertension. The DASH diet may help you lower blood pressure. It may also help keep you from getting high blood pressure. You will eat less fat and more fiber on the DASH diet.  This diet gives you more minerals that fight high blood pressure. Some nutrients in this diet are:  · Potassium ? Acts to help you get rid of salt. This may help to lower blood pressure.  · Calcium ? Makes blood vessels and muscles work the right way  · Magnesium - Helps blood vessels relax  · Fiber ? Helps you feel full. It also helps digestion.  What will the results be?   The DASH diet may help you:  · Lower your blood pressure and cholesterol  · Lower your risk for cancer, heart disease, heart attack, and stroke. It may also lower your risk for heart failure, kidney stones, and diabetes.  · Lose weight or keep a healthy weight  What lifestyle changes are needed?   · Add regular exercise to get the most help from this diet.  · Try to lower stress. Find ways to relax.  · Stop smoking. Avoid secondhand smoke.  · Limit alcohol intake.  What changes to diet are needed?   · Know about poor eating habits. Then, you can fix them as you work with the program.  · This diet encourages fruits and vegetables, whole grains, lean meats, healthy fats, and low-fat or fat-free dairy products.  · This diet is lower in saturated fats, trans-fats, cholesterol, added sugars, and sodium.  Who should use this diet?   This eating plan is good for the whole family. It is also good for people with high blood pressure and those at risk for high blood pressure.  What foods are good to eat?   · Grains: Try to eat 6 to 8 servings of whole grain, high fiber foods each day. These are bread, cereals, brown rice, or pasta.  · Fruits and vegetables: Eat 4 to 5 servings each day. Try to pick many kinds and colors. Fresh or frozen are best. Look for low sodium or salt-free if  you choose canned.  · Dairy: Try to eat 2 to 3 servings of fat free and low fat milk products each day.  · Lean meats, poultry, and seafood: Try to eat 6 servings or less of lean meats, poultry, and seafood each day. Try to choose more low fat or lean meats like chicken and turkey. Eat less red meat. Eat more fish instead.  · Nuts, seeds, and legumes (dry beans and peas): Try to eat 4 to 5 servings each week. Try to pick nuts such as almonds and walnuts, sunflower seeds, peanut butter, soy beans, lentils, kidney beans, and split peas.  · Fats and oils: Try to eat 2 to 3 servings of fats and oils each day. Eat good fats found in fish, nuts, and avocados. Try using olive oil or vegetable oils such as canola oil. Other good oils to try are corn, safflower, sunflower, or soybean oils. Use low-sodium and low-fat salad dressing and mayonnaise.  · Condiments: Pepper, herbs, spices, vinegar, lemon or lime juices are great for seasoning. Be careful to choose low-sodium or salt-free products if you use broths, soups, or soy sauce.  · Sweets: Try to eat less than 5 servings each week. Choose low-fat and trans fat-free desserts. These are things like fruit flavored gelatin, sorbet, jelly beans, suresh crackers, animal crackers, low-fat fig bars, and rosa snaps. Eat fruit to satisfy your desire for sweets.     What foods should be limited or avoided?   · Grains: Salted breads, rolls, crackers, quick breads, self-rising flours, biscuit mixes, regular bread crumbs, instant hot cereals, commercially-prepared rice, pasta, stuffing mixes  · Fruits and vegetables: Commercially-prepared potatoes and vegetable mixes, regular canned vegetables and juices, vegetables frozen with sauce or pickled vegetables, processed fruits with salt or sodium  · Milk: Whole milk, malted milk, chocolate milk, buttermilk, cheese, ice cream  · Meats and beans: Smoked, cured, salted, or canned fish; meats or poultry such as davis, sausages, sardines;  high-fat cuts of meat like beef, lamb, or pork; chicken with the skin on it  · Fats: Cut back on solid fats like butter, lard, and margarine. Eat less food with high saturated fat, cholesterol and total fat.  · Condiments and snacks: Salted and canned peas, beans, and olives; salted snack foods; fried foods; soda or other sweetened drinks  · Sweets: High-fat baked goods such as muffins, donuts, pastries, commercial baked goods, candy bars  · If you choose to drink alcohol, limit the amount you drink. Women should have 1 drink or less per day and men should have 2 drinks or less per day.  Helpful tips   · Avoid eating canned vegetables and processed foods. These have a lot of salt in them. Look for a low-salt or low-sodium choice.  · Try baking or broiling instead of frying food.  · Write down the foods you eat. This will help you track what you have eaten each week.  · When you go to a grocery store, have a list or a meal plan. Do not shop when you are hungry to avoid cravings for foods.  · Read food labels with care. They will show you how much is in a serving. The amount is given as a percentage of the total amount you need each day. Reading labels will help you make healthy food choices.       · Avoid fast foods.  · Talk to your doctor or dietitian to see if you need vitamin and mineral supplements to help you balance your diet.  · Talk to a dietitian for help.  Where can I learn more?   Academy [employer] Dietetics  https://www.eatright.org/health/wellness/heart-and-cardiovascular-health/dash-diet-reducing-hypertension-through-diet-and-lifestyle   FamilyDoctor.org  http://familydoctor.org/familydoctor/en/prevention-wellness/food-nutrition/weight-loss/the-dash-diet-healthy-eating-to-control-your-blood-pressure.html   Last Reviewed Date   2021-03-15  Consumer Information Use and Disclaimer   This information is not specific medical advice and does not replace information you receive from your health care  provider. This is only a brief summary of general information. It does NOT include all information about conditions, illnesses, injuries, tests, procedures, treatments, therapies, discharge instructions or life-style choices that may apply to you. You must talk with your health care provider for complete information about your health and treatment options. This information should not be used to decide whether or not to accept your health care providers advice, instructions or recommendations. Only your health care provider has the knowledge and training to provide advice that is right for you.  Copyright   Copyright © 2021 UpToDate, Inc. and its affiliates and/or licensors. All rights reserved.    Patient Education       Type 2 Diabetes   The Basics   Written by the doctors and editors at [employer]   What is type 2 diabetes? -- Type 2 diabetes is a disorder that disrupts the way your body uses sugar. It is sometimes called type 2 diabetes mellitus.  All the cells in your body need sugar to work normally. Sugar gets into the cells with the help of a hormone called insulin. Insulin is made by the pancreas, an organ in the belly. If there is not enough insulin, or if your body stops responding to insulin, sugar builds up in the blood. That is what happens to people with diabetes.  There are two different types of diabetes:   · Type 1 diabetes - In type 1 diabetes, the pancreas does not make insulin or makes very little insulin.  · Type 2 diabetes - In most people with type 2 diabetes, the body stops responding to insulin normally. Then, over time, the pancreas stops making enough insulin.   Being overweight or obese increases a person's risk of developing type 2 diabetes. But people who are not overweight can get diabetes, too.  What are the symptoms of type 2 diabetes? -- Type 2 diabetes usually causes no symptoms. When symptoms do occur, they include:  · Needing to urinate often  · Intense thirst  · Blurry  "vision  Can diabetes lead to other health problems? -- Yes. Type 2 diabetes might not make you feel sick. But if it is not managed, it can lead to serious problems over time, such as:  · Heart attacks  · Strokes  · Kidney disease  · Vision problems (or even blindness)  · Pain or loss of feeling in the hands and feet  · Needing to have fingers, toes, or other body parts removed (amputated)  How do I know if I have type 2 diabetes? -- To find out if you have type 2 diabetes, your doctor or nurse can do a blood test. There are 2 tests that can be used for this. Both involve measuring the amount of sugar in your blood, called your "blood sugar" or "blood glucose":   · One of the tests measures your blood sugar at the time the blood sample is taken. This test is done in the morning. You can't eat or drink anything except water for at least 8 hours before the test.   · The other test shows what your average blood sugar has been for the past 2 to 3 months. This blood test is called "hemoglobin A1C" or just "A1C." It can be checked at any time of the day, even if you have recently eaten.  How is type 2 diabetes treated? -- The goals of treatment are to control your blood sugar and lower the risk of future problems that can happen in people with diabetes. An important part of managing diabetes is to eat healthy foods and get plenty of physical activity.  There are a few medicines that help control blood sugar. Some people need to take pills that help the body make more insulin or that help insulin do its job. Others need insulin shots.  Depending on what medicines you take, you might need to check your blood sugar regularly at home. But not everyone with type 2 diabetes needs to do this. Your doctor or nurse will tell you if you should be checking your blood sugar, and when and how to do this.  Sometimes, people with type 2 diabetes also need medicines to reduce the problems caused by the disease. For instance, medicines " used to lower blood pressure can reduce the chances of a heart attack or stroke.  It's also important to get certain vaccines, such as vaccines to protect against the flu and coronavirus disease 2019 (COVID-19). Some people also need a vaccine to prevent pneumonia.  Can type 2 diabetes be prevented? -- Yes. To lower your chances of getting type 2 diabetes, the most important thing you can do is eat a healthy diet and get plenty of physical activity. This can help you lose weight if you are overweight. But eating well and being active are also good for your overall health. Even gentle activity, like walking, has benefits.  If you smoke, quitting can also lower your risk of type 2 diabetes. Quitting smoking can be hard to do, but your doctor or nurse can help.  All topics are updated as new evidence becomes available and our peer review process is complete.  This topic retrieved from [employer] on: Sep 21, 2021.  Topic 54236 Version 14.0  Release: 29.4.2 - C29.263  © 2021 UpToDate, Inc. and/or its affiliates. All rights reserved.  Consumer Information Use and Disclaimer   This information is not specific medical advice and does not replace information you receive from your health care provider. This is only a brief summary of general information. It does NOT include all information about conditions, illnesses, injuries, tests, procedures, treatments, therapies, discharge instructions or life-style choices that may apply to you. You must talk with your health care provider for complete information about your health and treatment options. This information should not be used to decide whether or not to accept your health care provider's advice, instructions or recommendations. Only your health care provider has the knowledge and training to provide advice that is right for you. The use of this information is governed by the [employer] End User License Agreement, available at  "https://www.[employer].com/en/solutions/lexicomp/about/brad.The use of [employer] content is governed by the [employer] Terms of Use. ©2021 UpToDate, Inc. All rights reserved.  Copyright   © 2021 UpToDate, Inc. and/or its affiliates. All rights reserved.    Patient Education       Diabetes and Diet   The Basics   Written by the doctors and editors at [employer]   Why is diet important in diabetes? -- Diet is important because it is part of diabetes treatment. Many people need to change what they eat and how much they eat to help treat their diabetes. It is important for people to treat their diabetes so that they:  · Keep their blood sugar at or near a normal level  · Prevent long-term problems, such as heart or kidney problems, that can happen in people with diabetes  Changing your diet can also help treat obesity, high blood pressure, and high cholesterol. These conditions can affect people with diabetes and can lead to future problems, such as heart attacks or strokes.  Who will work with me to change my diet? -- Your doctor or nurse will work with you to make a food plan to change your diet. They might also recommend that you work with a "dietitian." A dietitian is an expert on food and eating.  Do I need to eat at the same times every day? -- When and how often you should eat depends, in part, on the diabetes medicines you take. For example:  · People who take about the same amount of insulin at the same time each day (called a "fixed regimen") should eat meals at the same times. This is also true for people who take pills that increase insulin levels, such as sulfonylureas. Eating meals at the same time every day helps prevent low blood sugar.  · People who adjust the dose and timing of their insulin each day (called a "flexible regimen") do not always have to eat meals at the same time. That's because they can time their insulin dose for before they plan to eat, and also adjust the dose for how much they plan to " "eat.  · People who take medicines that don't usually cause low blood sugar, such as metformin, don't have to eat meals at the same time every day.  What do I need to think about when planning what to eat? -- Our bodies break down the food we eat into small pieces called carbohydrates, proteins, and fats.  When planning what to eat, people with diabetes need to think about:  · Carbohydrates (or "carbs") - Carbohydrates, which are sugars that our bodies use for energy, can raise a person's blood sugar level. Your doctor, nurse, or dietitian will tell you how many carbohydrates you should eat at each meal or snack. Foods that have carbohydrates include:  ? Bread, pasta, and rice  ? Vegetables and fruits  ? Dairy foods  ? Foods and drinks with added sugar  It is best to get your carbohydrates from fruits, vegetables, whole grains, and low-fat milk. It is best to avoid drinks with added sugar, like soda, juices, and sports drinks.   · Protein - Your doctor, nurse, or dietitian will tell you how much protein you should eat each day. It is best to eat lean meats, fish, eggs, beans, peas, soy products, nuts, and seeds.  · Fats - The type of fat you eat is more important than the amount of fat. "Saturated" and "trans" fats can increase your risk for heart problems, like a heart attack.  ? Foods that have saturated fats include meat, butter, cheese, and ice cream.  ? Foods that have trans fats include processed food with "partially hydrogenated oils" on the ingredient list. This may include fried foods, store bought cookies, muffins, pies, and cakes.  "Monounsaturated" and "polyunsaturated" fats are better for you. Foods with these types of fat include fish, avocado, olive oil, and nuts.  · Calories - People need to eat a certain amount of calories each day to keep their weight the same. People who are overweight and want to lose weight need to eat fewer calories each day.  · Fiber - Eating foods with a lot of fiber can help " control a person's blood sugar level. Foods that have a lot of fiber include apples, green beans, peas, beans, lentils, nuts, oatmeal, and whole grains.  · Salt - People who have high blood pressure should not eat foods that contain a lot of salt (also called sodium). People with high blood pressure should also eat healthy foods, such as fruits, vegetables, and low-fat dairy foods.  · Alcohol - Having more than 1 drink (for women) or 2 drinks (for men) a day can raise blood sugar levels. Also, drinks that have fruit juice or soda in them can raise blood sugar levels.  What can I do if I need to lose weight? -- If you need to lose weight, you can:  · Exercise - Try to get at least 30 minutes of physical activity a day, most days of the week. Even gentle exercise, like walking, is good for your health. Some people with diabetes need to change their medicine dose before they exercise. They might also need to check their blood sugar levels before and after exercising.  · Eat fewer calories - Your doctor, nurse, or dietitian can tell you how many calories you should eat each day in order to lose weight.  If you are worried about your weight, size, or shape, talk with your doctor, nurse, or dietitian. They can help you make changes to improve your health.  Can I eat the same foods as my family? -- Yes. You do not need to eat special foods if you have diabetes. You and your family can eat the same foods. Changing your diet is mostly about eating healthy foods and not eating too much.  What are the other parts of diabetes treatment? -- Besides changing your diet, the other parts of diabetes treatment are:  · Exercise  · Medicines  Some people with diabetes need to learn how to match their diet and exercise with their medicine dose. For example, people who use insulin might need to choose the dose of insulin they give themselves. To choose their dose, they need to think about:  · What they plan to eat at the next meal  · How  much exercise they plan to do  · What their blood sugar level is  If the diet and exercise do not match the medicine dose, a person's blood sugar level can get too low or too high. Blood sugar levels that are too low or too high can cause problems.  All topics are updated as new evidence becomes available and our peer review process is complete.  This topic retrieved from [employer] on: Sep 21, 2021.  Topic 44140 Version 7.0  Release: 29.4.2 - C29.263  © 2021 UpToDate, Inc. and/or its affiliates. All rights reserved.  Consumer Information Use and Disclaimer   This information is not specific medical advice and does not replace information you receive from your health care provider. This is only a brief summary of general information. It does NOT include all information about conditions, illnesses, injuries, tests, procedures, treatments, therapies, discharge instructions or life-style choices that may apply to you. You must talk with your health care provider for complete information about your health and treatment options. This information should not be used to decide whether or not to accept your health care provider's advice, instructions or recommendations. Only your health care provider has the knowledge and training to provide advice that is right for you. The use of this information is governed by the [employer] End User License Agreement, available at https://www.[employer]/en/solutions/Dishcrawl/about/brad.The use of [employer] content is governed by the [employer] Terms of Use. ©2021 UpToDate, Inc. All rights reserved.  Copyright   © 2021 UpToDate, Inc. and/or its affiliates. All rights reserved.    Patient Education       Preventing Falls   The Basics   Written by the doctors and editors at [employer]   Am I at risk of falling? -- Your risk of falling increases as you grow older. That's because getting older can make it harder to walk steadily and keep your balance. Also, the effects of falls are more serious  in older people.  Overall, 3 to 4 out of every 10 people over the age of 65 fall each year. Up to 75 percent of people who fracture a hip never recover to the point they were before they had their fracture. If you have fallen in the past, you are at higher risk of falling again.  Several things can increase your risk of a fall, including:  · Illness  · A change in the medicines you take  · An unsafe or unfamiliar setting (for example, a room with rugs or furniture that might trip you, or an area you don't know well)  How can my doctor help me to avoid falling? -- Your doctor can talk to you about the following things:  · Past falls - It is important to tell your doctor about any times you have fallen or almost fallen. He or she can then suggest ways to prevent another fall.  · Your health conditions - Some health problems can put you at risk of falling. These include conditions that affect eyesight, hearing, muscle strength, or balance.  · The medicines you take - Certain medicines can increase the risk of falling. These include some medicines that are used for sleeping problems, anxiety, high blood pressure, or depression. Adding new medicines, or changing doses of some medicines, can also affect your risk of falling.  The more your doctor knows about your situation, the better he or she will be able to help you. For example, if you fell because you have a condition that causes pain, your doctor might suggest treatments to deal with the pain. Or if one of your medicines is making you dizzy and more likely to fall, your doctor might switch you to a different medicine.  Is there anything I can do on my own? -- Yes. To help keep from falling, you can:  · Make your home safer - To avoid falling at home, get rid of things that might make you trip or slip. This might include furniture, electrical cords, clutter, and loose rugs (figure 1). Keep your home well-lit so that you can easily see where you are going. Avoid  storing things in high places so you don't have to reach or climb.  · Wear sturdy shoes that fit well - Wearing shoes with high heels or slippery soles, or shoes that are too loose, can lead to falls. Walking around in bare feet, or only socks, can also increase your risk of falling.  · Take vitamin D pills - Taking vitamin D might lower the risk of falls in older people. This is because vitamin D helps make bones and muscles stronger. Your doctor can talk to you about whether you should take extra vitamin D, and how much.  · Stay active - Exercising on a regular basis can help lower your risk of falling. It might also help prevent you from getting hurt if you do fall. It is best to do a few different activities that help with both strength and balance. There are many kinds of exercise that can be safe for older people. These include walking, swimming, and Sal Chi (a Chinese martial art that involves slow, gentle movements).  · Use a cane, walker, and other safety devices - If your doctor recommends that you use a cane or walker, be sure that it's the right size and you know how to use it. There are other devices that might help you avoid falling, too. These include grab bars or a sturdy seat for the shower, non-slip bath mats, and hand rails or treads for the stairs (to prevent slipping).  If you worry that you could fall, there are also alarm buttons that let you call for help if you fall and can't get up.  What should I do if I fall? -- If you fall, see your doctor right away, even if you aren't hurt. Your doctor can try to figure out what caused you to fall, and how likely you are to fall again. He or she will do an exam and talk to you about your health problems, medicines, and activities. Then he or she can suggest things you can do to avoid falling again.  Many older people have a hard time recovering after a fall. Doing things to prevent falling can help you to protect your health and independence.  All  topics are updated as new evidence becomes available and our peer review process is complete.  This topic retrieved from [employer] on: Sep 21, 2021.  Topic 66400 Version 18.0  Release: 29.4.2 - C29.263  © 2021 UpToDate, Inc. and/or its affiliates. All rights reserved.  figure 1: How to avoid falling at home     This picture shows some of the things that can cause a fall in your home. Look around and remove any loose rugs, electrical cords, clutter, or furniture that could trip you.  Graphic 02090 Version 1.0    Consumer Information Use and Disclaimer   This information is not specific medical advice and does not replace information you receive from your health care provider. This is only a brief summary of general information. It does NOT include all information about conditions, illnesses, injuries, tests, procedures, treatments, therapies, discharge instructions or life-style choices that may apply to you. You must talk with your health care provider for complete information about your health and treatment options. This information should not be used to decide whether or not to accept your health care provider's advice, instructions or recommendations. Only your health care provider has the knowledge and training to provide advice that is right for you. The use of this information is governed by the [employer] End User License Agreement, available at https://www.Accupal.Booodl/en/solutions/ABC Live/about/brad.The use of [employer] content is governed by the [employer] Terms of Use. ©2021 UpToDate, Inc. All rights reserved.  Copyright   © 2021 UpToDate, Inc. and/or its affiliates. All rights reserved.

## 2022-01-31 RX ORDER — DAPAGLIFLOZIN AND METFORMIN HYDROCHLORIDE 5; 1000 MG/1; MG/1
1 TABLET, FILM COATED, EXTENDED RELEASE ORAL DAILY
Qty: 90 TABLET | Refills: 1 | Status: SHIPPED | OUTPATIENT
Start: 2022-01-31 | End: 2022-05-24 | Stop reason: SDUPTHER

## 2022-01-31 NOTE — TELEPHONE ENCOUNTER
Pt had to be changed to farxiga due to insurance no longer covering xigduo- his insurance plan has changed and he now needs the xigduo to be sent in.

## 2022-04-30 ENCOUNTER — HOSPITAL ENCOUNTER (EMERGENCY)
Facility: HOSPITAL | Age: 67
Discharge: HOME | End: 2022-04-30
Attending: EMERGENCY MEDICINE
Payer: COMMERCIAL

## 2022-04-30 VITALS
RESPIRATION RATE: 18 BRPM | HEART RATE: 108 BPM | SYSTOLIC BLOOD PRESSURE: 129 MMHG | BODY MASS INDEX: 28.61 KG/M2 | OXYGEN SATURATION: 98 % | DIASTOLIC BLOOD PRESSURE: 92 MMHG | TEMPERATURE: 99 F | WEIGHT: 178 LBS | HEIGHT: 66 IN

## 2022-04-30 DIAGNOSIS — S51.012A LACERATION OF LEFT ELBOW, INITIAL ENCOUNTER: ICD-10-CM

## 2022-04-30 DIAGNOSIS — S01.01XA LACERATION OF SCALP, INITIAL ENCOUNTER: Primary | ICD-10-CM
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
## 2022-04-30 PROCEDURE — 99284 EMERGENCY DEPT VISIT MOD MDM: CPT | Mod: 25

## 2022-04-30 PROCEDURE — 25000003 PHARM REV CODE 250: Performed by: EMERGENCY MEDICINE

## 2022-04-30 PROCEDURE — 12001 RPR S/N/AX/GEN/TRNK 2.5CM/<: CPT | Mod: ,,, | Performed by: EMERGENCY MEDICINE

## 2022-04-30 PROCEDURE — 99282 PR EMERGENCY DEPT VISIT,LEVEL II: ICD-10-PCS | Mod: 25,,, | Performed by: EMERGENCY MEDICINE

## 2022-04-30 PROCEDURE — 99282 EMERGENCY DEPT VISIT SF MDM: CPT | Mod: 25,,, | Performed by: EMERGENCY MEDICINE

## 2022-04-30 PROCEDURE — 12001 RPR S/N/AX/GEN/TRNK 2.5CM/<: CPT

## 2022-04-30 PROCEDURE — 12001 PR RESUPERF WND BODY <2.5CM: ICD-10-PCS | Mod: ,,, | Performed by: EMERGENCY MEDICINE

## 2022-04-30 RX ORDER — ACETAMINOPHEN 325 MG/1
650 TABLET ORAL
Status: COMPLETED | OUTPATIENT
Start: 2022-04-30 | End: 2022-04-30

## 2022-04-30 RX ADMIN — BACITRACIN ZINC, NEOMYCIN, POLYMYXIN B 1 EACH: 400; 3.5; 5 OINTMENT TOPICAL at 04:04

## 2022-04-30 RX ADMIN — ACETAMINOPHEN 650 MG: 325 TABLET ORAL at 04:04

## 2022-04-30 NOTE — DISCHARGE INSTRUCTIONS
Staple removal in 10 days.  Return if worse or new symptoms.  Keep wounds clean..    Use Tylenol and ibuprofen as needed

## 2022-04-30 NOTE — ED PROVIDER NOTES
Encounter Date: 4/30/2022    SCRIBE #1 NOTE: I, Trinity Quinn, am scribing for, and in the presence of,  Santi Lugo MD. I have scribed the entire note.       History     Chief Complaint   Patient presents with    Fall     Patient is a 66 year old male who presents to the emergency department due to a fall he endured just prior to arrival. Patient states that he was getting out of the back of his truck when his shoelace got stuck and he fell onto the gravel below. He reports landing on his left elbow and back. Patient displays abrasions to his left elbow and back of scalp, he reports that the most pain in his elbow. Patient has a history of diabetes and hypertension. Denies any cardiac issues or use of blood thinners.     The history is provided by the patient. No  was used.     Review of patient's allergies indicates:   Allergen Reactions    Pineapple Hives     Past Medical History:   Diagnosis Date    COVID-19 08/20/2021    Deficiency of testosterone biosynthesis     Diabetes mellitus, type 2     Hypertension     Long term (current) use of oral hypoglycemic drugs     Other long term (current) drug therapy     Personal history of malignant melanoma of skin 1998    Personal history of malignant neoplasm of other parts of nervous tissue 04/2015    Referred to Dr. 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    HENT: Negative.    Eyes: Negative.    Respiratory: Negative.    Cardiovascular: Negative.    Gastrointestinal: Negative.    Endocrine: Negative.    Genitourinary: Negative.    Musculoskeletal: Negative for neck pain.        Left elbow pain   Skin:        Multiple skin tears to back of head  Laceration to elbow, left   Allergic/Immunologic: Negative.    Neurological: Negative.    Hematological: Negative.    Psychiatric/Behavioral: Negative.    All other systems reviewed and are negative.      Physical Exam     Initial Vitals [04/30/22 1614]   BP Pulse Resp Temp SpO2   (!) 129/92 108 18 99 °F (37.2 °C) 98 %      MAP       --         Physical Exam    Nursing note and vitals reviewed.  Constitutional: He appears well-developed and well-nourished.   HENT:   Head: Normocephalic.   4 skin tears to back of scalp at approximately 1 cm each   Eyes: EOM are normal. Pupils are equal, round, and reactive to light.   Neck: Neck supple. No thyromegaly present.   Normal range of motion.  Cardiovascular: Normal rate, regular rhythm, normal heart sounds and intact distal pulses.   No murmur heard.  Pulmonary/Chest: Breath sounds normal. No respiratory distress. He has no wheezes.   Abdominal: Abdomen is soft. Bowel sounds are normal. He exhibits no distension. There is no abdominal tenderness.   Musculoskeletal:         General: No tenderness or edema. Normal range of motion.      Left elbow: Laceration (1cm ) present. Normal range of motion.      Cervical back: Normal range of motion and neck supple.      Comments: Hematoma to left elbow     Lymphadenopathy:     He has no cervical adenopathy.   Neurological: He is alert and oriented to person, place, and time. He has normal strength. No cranial nerve deficit or sensory deficit.   Skin: Skin is warm and dry. Capillary refill takes less than 2 seconds. No rash  noted.   Psychiatric: He has a normal mood and affect.         ED Course   Lac Repair    Date/Time: 4/30/2022 4:27 PM  Performed by: Santi Lugo MD  Authorized by: Santi Lugo MD     Consent:     Consent obtained:  Verbal    Consent given by:  Patient    Risks, benefits, and alternatives were discussed: yes    Anesthesia:     Anesthesia method:  None  Laceration details:     Location:  Shoulder/arm    Shoulder/arm location:  L elbow    Length (cm):  1  Pre-procedure details:     Preparation:  Patient was prepped and draped in usual sterile fashion  Exploration:     Limited defect created (wound extended): no    Treatment:     Area cleansed with:  Saline    Amount of cleaning:  Standard  Skin repair:     Repair method:  Staples    Number of staples:  2  Approximation:     Approximation:  Close  Repair type:     Repair type:  Simple  Post-procedure details:     Dressing:  Open (no dressing)    Procedure completion:  Tolerated well, no immediate complications      Labs Reviewed - No data to display       Imaging Results          X-Ray Thoracic Spine AP And Lateral (Final result)  Result time 04/30/22 17:35:48    Final result by Archie Logan DO (04/30/22 17:35:48)                 Impression:      As above.      Electronically signed by: Archie Logan  Date:    04/30/2022  Time:    17:35             Narrative:    EXAMINATION:  XR THORACIC SPINE AP LATERAL    CLINICAL HISTORY:  Unspecified fall, initial encounter    TECHNIQUE:  XR THORACIC SPINE AP LATERAL    COMPARISON:  Comparisons were reviewed, if available.    FINDINGS:  No acute fracture or dislocation.    Mild degenerative change                               X-Ray Chest 1 View (Final result)  Result time 04/30/22 17:34:48    Final result by Archie Logan DO (04/30/22 17:34:48)                 Impression:      As above.      Electronically signed by: Archie Logan  Date:    04/30/2022  Time:    17:34             Narrative:     EXAMINATION:  XR CHEST 1 VIEW    CLINICAL HISTORY:  Unspecified fall, initial encounter    TECHNIQUE:  XR CHEST 1 VIEW    COMPARISON:  Comparisons were reviewed, if available.    FINDINGS:  No lines or tubes.    Lungs are clear.    Normal pleura.    Cardiac silhouette is similar to comparison exam.    No new acute bone findings.                               X-Ray Elbow Complete Left (Final result)  Result time 04/30/22 17:35:28    Final result by Archie Logan DO (04/30/22 17:35:28)                 Impression:      As above.      Electronically signed by: Archie Logan  Date:    04/30/2022  Time:    17:35             Narrative:    EXAMINATION:  XR ELBOW COMPLETE 3 VIEW LEFT    CLINICAL HISTORY:  Unspecified fall, initial encounter    TECHNIQUE:  XR ELBOW COMPLETE 3 VIEW LEFT    COMPARISON:  Comparisons were reviewed, if available.    FINDINGS:  No acute fracture or dislocation.    Moderate degenerative change    No radiopaque foreign bodies.                               CT Head Without Contrast (Final result)  Result time 04/30/22 17:33:46    Final result by Archie Logan DO (04/30/22 17:33:46)                 Impression:      No acute intracranial findings.      Electronically signed by: Archie Logan  Date:    04/30/2022  Time:    17:33             Narrative:    EXAMINATION:  CT HEAD WITHOUT CONTRAST    CLINICAL HISTORY:  fall;    TECHNIQUE:  Multiplanar CT imaging from the vertex to skull the skull base was performed without contrast.    COMPARISON:  Comparison is were reviewed, if available.    FINDINGS:  Gray-white white differentiation is normal.    There is no CT evidence of acute intracranial hemorrhage or large territorial infarct. No epidural/subdural hematomas.    There is no evidence of hydrocephalus, midline shift or mass effect.    Scalp, paranasal sinuses, and mastoid air cells are normal.    Soft tissue injury posterior scalp adjacent to the left parietal bone                                  Medications   acetaminophen tablet 650 mg (650 mg Oral Given 4/30/22 1645)   neomycin-bacitracnZn-polymyxnB packet (1 each Topical (Top) Given 4/30/22 1645)                Attending Attestation:           Physician Attestation for Scribe:  Physician Attestation Statement for Scribe #1: I, Santi Lugo MD, reviewed documentation, as scribed by Trinity Quinn in my presence, and it is both accurate and complete.             ED Course as of 05/01/22 0554   Sat Apr 30, 2022   1738 X-Ray Elbow Complete Left [PK]      ED Course User Index  [PK] Santi Lugo MD             Clinical Impression:   Final diagnoses:  [W19.XXXA] Fall  [S01.01XA] Laceration of scalp, initial encounter (Primary)  [S51.012A] Laceration of left elbow, initial encounter          ED Disposition Condition    Discharge Stable        ED Prescriptions     None        Follow-up Information     Follow up With Specialties Details Why Contact [facility] - Emergency Department Emergency Medicine In 10 days For staple removal and return as needed [address] [place] [id number] [phone]           Santi Lugo MD  05/01/22 0524

## 2022-05-24 ENCOUNTER — TELEPHONE (OUTPATIENT)
Dept: FAMILY MEDICINE | Facility: CLINIC | Age: 67
End: 2022-05-24
Payer: COMMERCIAL

## 2022-05-24 DIAGNOSIS — I10 ESSENTIAL HYPERTENSION: ICD-10-CM

## 2022-05-24 RX ORDER — LISINOPRIL 20 MG/1
20 TABLET ORAL DAILY
Qty: 30 TABLET | Refills: 0 | Status: SHIPPED | OUTPATIENT
Start: 2022-05-24 | End: 2022-08-04 | Stop reason: SDUPTHER

## 2022-05-24 RX ORDER — DAPAGLIFLOZIN AND METFORMIN HYDROCHLORIDE 5; 1000 MG/1; MG/1
1 TABLET, FILM COATED, EXTENDED RELEASE ORAL DAILY
Qty: 30 TABLET | Refills: 0 | Status: SHIPPED | OUTPATIENT
Start: 2022-05-24 | End: 2022-09-07 | Stop reason: SDUPTHER

## 2022-05-24 RX ORDER — PRAVASTATIN SODIUM 10 MG/1
10 TABLET ORAL DAILY
Qty: 30 TABLET | Refills: 0 | Status: SHIPPED | OUTPATIENT
Start: 2022-05-24 | End: 2023-01-05 | Stop reason: SDUPTHER

## 2022-05-24 NOTE — TELEPHONE ENCOUNTER
30 DAY SUPPLY SENT IN   WILL NEED APPT PRIOR TO ANY MORE REFILLS     ----- Message from Cadence Vee sent at 5/23/2022  8:52 AM CDT -----  Patient needs a refill on pravastatin, xigduo, called into [facility] pharmacy.  Please call patient at [phone] if you have any questions. Thanks!

## 2022-07-14 ENCOUNTER — CLINICAL SUPPORT (OUTPATIENT)
Dept: PRIMARY CARE CLINIC | Facility: CLINIC | Age: 67
End: 2022-07-14

## 2022-07-14 DIAGNOSIS — Z02.4 ENCOUNTER FOR COMMERCIAL DRIVING LICENSE (CDL) EXAM: Primary | ICD-10-CM

## 2022-07-14 PROCEDURE — 99499 UNLISTED E&M SERVICE: CPT | Mod: ,,, | Performed by: NURSE PRACTITIONER

## 2022-07-14 PROCEDURE — 99499 PR PHYSICAL - DOT/CDL: ICD-10-PCS | Mod: ,,, | Performed by: NURSE PRACTITIONER

## 2022-08-04 DIAGNOSIS — I10 ESSENTIAL HYPERTENSION: ICD-10-CM

## 2022-08-04 RX ORDER — LISINOPRIL 20 MG/1
20 TABLET ORAL DAILY
Qty: 90 TABLET | Refills: 1 | Status: SHIPPED | OUTPATIENT
Start: 2022-08-04 | End: 2023-01-05 | Stop reason: SDUPTHER

## 2022-08-04 NOTE — TELEPHONE ENCOUNTER
----- Message from Christy Sykes sent at 8/4/2022  1:52 PM CDT -----  Patient needs a refill on lisinopriL called into [facility] pharmacy.  Please call patient at [phone] if you have any questions. Thanks!

## 2022-08-26 ENCOUNTER — LAB VISIT (OUTPATIENT)
Dept: PRIMARY CARE CLINIC | Facility: CLINIC | Age: 67
End: 2022-08-26

## 2022-08-26 DIAGNOSIS — Z02.83 ENCOUNTER FOR EMPLOYMENT-RELATED DRUG TESTING: ICD-10-CM

## 2022-08-26 PROCEDURE — 99000 SPECIMEN HANDLING OFFICE-LAB: CPT | Mod: ,,, | Performed by: NURSE PRACTITIONER

## 2022-08-26 PROCEDURE — 82075 CHG ASSAY OF BREATH ETHANOL: ICD-10-PCS | Mod: ,,, | Performed by: NURSE PRACTITIONER

## 2022-08-26 PROCEDURE — 82075 ASSAY OF BREATH ETHANOL: CPT | Mod: ,,, | Performed by: NURSE PRACTITIONER

## 2022-08-26 PROCEDURE — 99000 PR SPECIMEN HANDLING,DR OFF->LAB: ICD-10-PCS | Mod: ,,, | Performed by: NURSE PRACTITIONER

## 2022-08-26 NOTE — PROGRESS NOTES
Subjective:       Patient ID: Vishal Grace is a 66 y.o. male.    Chief Complaint: No chief complaint on file.    HPI  Review of Systems      Objective:      Physical Exam    Assessment:       Problem List Items Addressed This Visit    None     Visit Diagnoses     Encounter for employment-related drug testing              Plan:       Drug testing only

## 2022-09-07 DIAGNOSIS — E11.65 TYPE 2 DIABETES MELLITUS WITH HYPERGLYCEMIA, WITHOUT LONG-TERM CURRENT USE OF INSULIN: Primary | ICD-10-CM

## 2022-09-07 RX ORDER — DAPAGLIFLOZIN AND METFORMIN HYDROCHLORIDE 5; 1000 MG/1; MG/1
1 TABLET, FILM COATED, EXTENDED RELEASE ORAL DAILY
Qty: 90 TABLET | Refills: 1 | Status: SHIPPED | OUTPATIENT
Start: 2022-09-07 | End: 2023-01-05 | Stop reason: SDUPTHER

## 2022-09-07 NOTE — TELEPHONE ENCOUNTER
----- Message from Cadence Vee sent at 9/7/2022  8:53 AM CDT -----  Patient needs a refill on xigduo called into [facility]  pharmacy at .  Please call patient at [phone] if you have any questions. Thanks!

## 2023-01-05 ENCOUNTER — OFFICE VISIT (OUTPATIENT)
Dept: FAMILY MEDICINE | Facility: CLINIC | Age: 68
End: 2023-01-05
Payer: COMMERCIAL

## 2023-01-05 ENCOUNTER — TELEPHONE (OUTPATIENT)
Dept: FAMILY MEDICINE | Facility: CLINIC | Age: 68
End: 2023-01-05
Payer: COMMERCIAL

## 2023-01-05 VITALS
RESPIRATION RATE: 18 BRPM | TEMPERATURE: 98 F | OXYGEN SATURATION: 96 % | DIASTOLIC BLOOD PRESSURE: 80 MMHG | SYSTOLIC BLOOD PRESSURE: 130 MMHG | BODY MASS INDEX: 28.77 KG/M2 | HEART RATE: 70 BPM | WEIGHT: 179 LBS | HEIGHT: 66 IN

## 2023-01-05 DIAGNOSIS — Z12.5 PROSTATE CANCER SCREENING: ICD-10-CM

## 2023-01-05 DIAGNOSIS — E11.65 TYPE 2 DIABETES MELLITUS WITH HYPERGLYCEMIA, WITHOUT LONG-TERM CURRENT USE OF INSULIN: Primary | Chronic | ICD-10-CM

## 2023-01-05 DIAGNOSIS — Z79.899 ENCOUNTER FOR LONG-TERM (CURRENT) USE OF OTHER MEDICATIONS: ICD-10-CM

## 2023-01-05 DIAGNOSIS — E11.65 TYPE 2 DIABETES MELLITUS WITH HYPERGLYCEMIA, WITHOUT LONG-TERM CURRENT USE OF INSULIN: Chronic | ICD-10-CM

## 2023-01-05 DIAGNOSIS — Z12.11 COLON CANCER SCREENING: Primary | ICD-10-CM

## 2023-01-05 DIAGNOSIS — Z23 NEED FOR SHINGLES VACCINE: ICD-10-CM

## 2023-01-05 DIAGNOSIS — I10 BENIGN ESSENTIAL HYPERTENSION: Chronic | ICD-10-CM

## 2023-01-05 DIAGNOSIS — E78.00 HYPERCHOLESTEREMIA: ICD-10-CM

## 2023-01-05 DIAGNOSIS — Z12.11 SCREENING FOR MALIGNANT NEOPLASM OF COLON: ICD-10-CM

## 2023-01-05 PROBLEM — Z86.16 PERSONAL HISTORY OF COVID-19: Status: RESOLVED | Noted: 2021-08-20 | Resolved: 2023-01-05

## 2023-01-05 LAB
ALBUMIN SERPL BCP-MCNC: 4.4 G/DL (ref 3.5–5)
ALBUMIN/GLOB SERPL: 1.4 {RATIO}
ALP SERPL-CCNC: 79 U/L (ref 45–115)
ALT SERPL W P-5'-P-CCNC: 33 U/L (ref 16–61)
ANION GAP SERPL CALCULATED.3IONS-SCNC: 14 MMOL/L (ref 7–16)
AST SERPL W P-5'-P-CCNC: 16 U/L (ref 15–37)
BASOPHILS # BLD AUTO: 0.02 K/UL (ref 0–0.2)
BASOPHILS NFR BLD AUTO: 0.3 % (ref 0–1)
BILIRUB SERPL-MCNC: 0.6 MG/DL (ref ?–1.2)
BUN SERPL-MCNC: 23 MG/DL (ref 7–18)
BUN/CREAT SERPL: 17 (ref 6–20)
CALCIUM SERPL-MCNC: 9.3 MG/DL (ref 8.5–10.1)
CHLORIDE SERPL-SCNC: 108 MMOL/L (ref 98–107)
CHOLEST SERPL-MCNC: 157 MG/DL (ref 0–200)
CHOLEST/HDLC SERPL: 3.8 {RATIO}
CO2 SERPL-SCNC: 23 MMOL/L (ref 21–32)
CREAT SERPL-MCNC: 1.33 MG/DL (ref 0.7–1.3)
CREAT UR-MCNC: 86 MG/DL (ref 39–259)
DIFFERENTIAL METHOD BLD: ABNORMAL
EGFR (NO RACE VARIABLE) (RUSH/TITUS): 59 ML/MIN/1.73M²
EOSINOPHIL # BLD AUTO: 0.35 K/UL (ref 0–0.5)
EOSINOPHIL NFR BLD AUTO: 5.1 % (ref 1–4)
ERYTHROCYTE [DISTWIDTH] IN BLOOD BY AUTOMATED COUNT: 13.1 % (ref 11.5–14.5)
EST. AVERAGE GLUCOSE BLD GHB EST-MCNC: 210 MG/DL
GLOBULIN SER-MCNC: 3.1 G/DL (ref 2–4)
GLUCOSE SERPL-MCNC: 200 MG/DL (ref 74–106)
HBA1C MFR BLD HPLC: 8.9 % (ref 4.5–6.6)
HCT VFR BLD AUTO: 49.9 % (ref 40–54)
HDLC SERPL-MCNC: 41 MG/DL (ref 40–60)
HGB BLD-MCNC: 17 G/DL (ref 13.5–18)
IMM GRANULOCYTES # BLD AUTO: 0.02 K/UL (ref 0–0.04)
IMM GRANULOCYTES NFR BLD: 0.3 % (ref 0–0.4)
LDLC SERPL CALC-MCNC: 90 MG/DL
LDLC/HDLC SERPL: 2.2 {RATIO}
LYMPHOCYTES # BLD AUTO: 1.52 K/UL (ref 1–4.8)
LYMPHOCYTES NFR BLD AUTO: 22.2 % (ref 27–41)
MCH RBC QN AUTO: 31.2 PG (ref 27–31)
MCHC RBC AUTO-ENTMCNC: 34.1 G/DL (ref 32–36)
MCV RBC AUTO: 91.6 FL (ref 80–96)
MICROALBUMIN UR-MCNC: 0.5 MG/DL (ref 0–2.8)
MICROALBUMIN/CREAT RATIO PNL UR: 5.8 MG/G (ref 0–30)
MONOCYTES # BLD AUTO: 0.54 K/UL (ref 0–0.8)
MONOCYTES NFR BLD AUTO: 7.9 % (ref 2–6)
MPC BLD CALC-MCNC: 10 FL (ref 9.4–12.4)
NEUTROPHILS # BLD AUTO: 4.39 K/UL (ref 1.8–7.7)
NEUTROPHILS NFR BLD AUTO: 64.2 % (ref 53–65)
NONHDLC SERPL-MCNC: 116 MG/DL
NRBC # BLD AUTO: 0 X10E3/UL
NRBC, AUTO (.00): 0 %
PLATELET # BLD AUTO: 196 K/UL (ref 150–400)
POTASSIUM SERPL-SCNC: 4.3 MMOL/L (ref 3.5–5.1)
PROT SERPL-MCNC: 7.5 G/DL (ref 6.4–8.2)
PSA SERPL-MCNC: 4.26 NG/ML
RBC # BLD AUTO: 5.45 M/UL (ref 4.6–6.2)
SODIUM SERPL-SCNC: 141 MMOL/L (ref 136–145)
TRIGL SERPL-MCNC: 128 MG/DL (ref 35–150)
TSH SERPL DL<=0.005 MIU/L-ACNC: 3.21 UIU/ML (ref 0.36–3.74)
VLDLC SERPL-MCNC: 26 MG/DL
WBC # BLD AUTO: 6.84 K/UL (ref 4.5–11)

## 2023-01-05 PROCEDURE — 80061 LIPID PANEL: CPT | Mod: ,,, | Performed by: CLINICAL MEDICAL LABORATORY

## 2023-01-05 PROCEDURE — 80053 COMPREHENSIVE METABOLIC PANEL: ICD-10-PCS | Mod: ,,, | Performed by: CLINICAL MEDICAL LABORATORY

## 2023-01-05 PROCEDURE — 90471 ZOSTER RECOMBINANT VACCINE: ICD-10-PCS | Mod: ,,, | Performed by: NURSE PRACTITIONER

## 2023-01-05 PROCEDURE — 85025 CBC WITH DIFFERENTIAL: ICD-10-PCS | Mod: ,,, | Performed by: CLINICAL MEDICAL LABORATORY

## 2023-01-05 PROCEDURE — 82043 MICROALBUMIN / CREATININE RATIO URINE: ICD-10-PCS | Mod: ,,, | Performed by: CLINICAL MEDICAL LABORATORY

## 2023-01-05 PROCEDURE — 85025 COMPLETE CBC W/AUTO DIFF WBC: CPT | Mod: ,,, | Performed by: CLINICAL MEDICAL LABORATORY

## 2023-01-05 PROCEDURE — 90750 ZOSTER RECOMBINANT VACCINE: ICD-10-PCS | Mod: ,,, | Performed by: NURSE PRACTITIONER

## 2023-01-05 PROCEDURE — 80061 LIPID PANEL: ICD-10-PCS | Mod: ,,, | Performed by: CLINICAL MEDICAL LABORATORY

## 2023-01-05 PROCEDURE — 90471 IMMUNIZATION ADMIN: CPT | Mod: ,,, | Performed by: NURSE PRACTITIONER

## 2023-01-05 PROCEDURE — 99214 PR OFFICE/OUTPT VISIT, EST, LEVL IV, 30-39 MIN: ICD-10-PCS | Mod: 25,,, | Performed by: NURSE PRACTITIONER

## 2023-01-05 PROCEDURE — 90750 HZV VACC RECOMBINANT IM: CPT | Mod: ,,, | Performed by: NURSE PRACTITIONER

## 2023-01-05 PROCEDURE — G0103 PSA SCREENING: HCPCS | Mod: ,,, | Performed by: CLINICAL MEDICAL LABORATORY

## 2023-01-05 PROCEDURE — G0103 PSA, SCREENING: ICD-10-PCS | Mod: ,,, | Performed by: CLINICAL MEDICAL LABORATORY

## 2023-01-05 PROCEDURE — 82570 MICROALBUMIN / CREATININE RATIO URINE: ICD-10-PCS | Mod: ,,, | Performed by: CLINICAL MEDICAL LABORATORY

## 2023-01-05 PROCEDURE — 99214 OFFICE O/P EST MOD 30 MIN: CPT | Mod: 25,,, | Performed by: NURSE PRACTITIONER

## 2023-01-05 PROCEDURE — 82043 UR ALBUMIN QUANTITATIVE: CPT | Mod: ,,, | Performed by: CLINICAL MEDICAL LABORATORY

## 2023-01-05 PROCEDURE — 82570 ASSAY OF URINE CREATININE: CPT | Mod: ,,, | Performed by: CLINICAL MEDICAL LABORATORY

## 2023-01-05 PROCEDURE — 80053 COMPREHEN METABOLIC PANEL: CPT | Mod: ,,, | Performed by: CLINICAL MEDICAL LABORATORY

## 2023-01-05 PROCEDURE — 84443 ASSAY THYROID STIM HORMONE: CPT | Mod: ,,, | Performed by: CLINICAL MEDICAL LABORATORY

## 2023-01-05 PROCEDURE — 83036 HEMOGLOBIN GLYCOSYLATED A1C: CPT | Mod: ,,, | Performed by: CLINICAL MEDICAL LABORATORY

## 2023-01-05 PROCEDURE — 84443 TSH: ICD-10-PCS | Mod: ,,, | Performed by: CLINICAL MEDICAL LABORATORY

## 2023-01-05 PROCEDURE — 83036 HEMOGLOBIN A1C: ICD-10-PCS | Mod: ,,, | Performed by: CLINICAL MEDICAL LABORATORY

## 2023-01-05 RX ORDER — LISINOPRIL 20 MG/1
20 TABLET ORAL DAILY
Qty: 90 TABLET | Refills: 1 | Status: SHIPPED | OUTPATIENT
Start: 2023-01-05 | End: 2023-01-05 | Stop reason: SDUPTHER

## 2023-01-05 RX ORDER — DAPAGLIFLOZIN AND METFORMIN HYDROCHLORIDE 5; 1000 MG/1; MG/1
1 TABLET, FILM COATED, EXTENDED RELEASE ORAL DAILY
Qty: 90 TABLET | Refills: 1 | Status: SHIPPED | OUTPATIENT
Start: 2023-01-05 | End: 2023-02-01

## 2023-01-05 RX ORDER — PRAVASTATIN SODIUM 10 MG/1
10 TABLET ORAL DAILY
Qty: 90 TABLET | Refills: 3 | Status: SHIPPED | OUTPATIENT
Start: 2023-01-05 | End: 2024-01-09 | Stop reason: DRUGHIGH

## 2023-01-05 RX ORDER — PRAVASTATIN SODIUM 10 MG/1
10 TABLET ORAL DAILY
Qty: 90 TABLET | Refills: 3 | Status: SHIPPED | OUTPATIENT
Start: 2023-01-05 | End: 2023-01-05 | Stop reason: SDUPTHER

## 2023-01-05 RX ORDER — DAPAGLIFLOZIN AND METFORMIN HYDROCHLORIDE 5; 1000 MG/1; MG/1
1 TABLET, FILM COATED, EXTENDED RELEASE ORAL DAILY
Qty: 90 TABLET | Refills: 1 | Status: SHIPPED | OUTPATIENT
Start: 2023-01-05 | End: 2023-01-05 | Stop reason: SDUPTHER

## 2023-01-05 RX ORDER — LISINOPRIL 20 MG/1
20 TABLET ORAL DAILY
Qty: 90 TABLET | Refills: 1 | Status: SHIPPED | OUTPATIENT
Start: 2023-01-05 | End: 2023-02-28 | Stop reason: SDUPTHER

## 2023-01-05 NOTE — PROGRESS NOTES
[facility] - FAMILY MEDICINE       PATIENT NAME: Vishal Grace   : 1955    AGE: 67 y.o. DATE OF ENCOUNTER: 23    MRN: [id number]      PCP: ANABELLA Cruz    Reason for Visit / Chief Complaint:  Diabetes and Follow-up (Patient presents to clinic for follow up of diabetes)     Subjective:     HPI:    Presents for f/u uncontrolled T2DM & HTN.  Last visit 1 yr ago, A1c 8.4%, was supposed to f/u May 2022.  Started new job 1 yr ago & got behind on f/u.  Forgot to start rx & just started taking statin last night.  Monitors glucose occasionally - last week 128; max had a couple > 200.  [facility]     Review of Systems:     Review of Systems   Constitutional: Negative.    HENT: Negative.     Respiratory: Negative.     Cardiovascular: Negative.    Gastrointestinal: Negative.    Skin: Negative.    Neurological: Negative.    Psychiatric/Behavioral: Negative.       Allergies:     Review of patient's allergies indicates:   Allergen Reactions    Pineapple Hives        Labs:      Lab Results   Component Value Date    HGBA1C 8.4 (H) 2022      Lipids:   Lab Results   Component Value Date    CHOL 144 2021     Lab Results   Component Value Date    HDL 43 2021     Lab Results   Component Value Date    LDLCALC 83 2021     Lab Results   Component Value Date    TRIG 90 2021     CMP:  Sodium   Date Value Ref Range Status   2022 136 136 - 145 mmol/L Final     Potassium   Date Value Ref Range Status   2022 4.2 3.5 - 5.1 mmol/L Final     Chloride   Date Value Ref Range Status   2022 106 98 - 107 mmol/L Final     Glucose   Date Value Ref Range Status   2022 209 (H) 74 - 106 mg/dL Final     BUN   Date Value Ref Range Status   2022 21 (H) 7 - 18 mg/dL Final     Creatinine   Date Value Ref Range Status   2022 1.20 0.70 - 1.30 mg/dL Final     AST   Date Value Ref Range Status   2021 9 (L) 15 - 37 U/L Final     ALT   Date Value  "Ref Range Status   05/25/2021 31 16 - 61 U/L Final     eGFR   Date Value Ref Range Status   01/11/2022 65 >=60 mL/min/1.73m² Final      CBC:  Lab Results   Component Value Date    WBC 7.57 05/25/2021    RBC 5.51 05/25/2021    HGB 17.0 05/25/2021    HCT 50.9 05/25/2021     05/25/2021      TSH:  Lab Results   Component Value Date    TSH 2.760 05/25/2021       Medical History:     Past Medical History:   Diagnosis Date    COVID-19 08/20/2021    Deficiency of testosterone biosynthesis     Diabetes mellitus, type 2     Hypertension     Long term (current) use of oral hypoglycemic drugs     Other long term (current) drug therapy     Personal history of COVID-19 8/20/2021    Personal history of malignant melanoma of skin 1998    Personal history of malignant neoplasm of other parts of nervous tissue 04/2015    Referred to Dr. 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; had re-excision of the area 6/4/15 without residual tumor noted; had 30 radiation treatments between 8/4/15-9/15/2015      Social History     Tobacco Use   Smoking Status Never   Smokeless Tobacco Never      Past Surgical History:   Procedure Laterality Date    EXCISION OF MELANOMA  1998    TUMOR EXCISION          Objective:      Wt Readings from Last 3 Encounters:   01/05/23 0717 81.2 kg (179 lb)   04/30/22 1614 80.7 kg (178 lb)   01/11/22 1041 79.4 kg (175 lb)     Vitals:    01/05/23 0717   BP: 130/80   BP Location: Left arm   Patient Position: Sitting   BP Method: Large (Manual)   Pulse: 70   Resp: 18   Temp: 97.9 °F (36.6 °C)   TempSrc: Oral   SpO2: 96%   Weight: 81.2 kg (179 lb)   Height: 5' 6" (1.676 m)     Body mass index is 28.89 kg/m².     Physical Exam:    Physical Exam  Vitals and nursing note reviewed.   Constitutional:       General: He is not in acute distress.     Appearance: Normal appearance.   HENT:      Head: Normocephalic.      " Right Ear: Tympanic membrane, ear canal and external ear normal.      Left Ear: Tympanic membrane, ear canal and external ear normal.      Nose: Nose normal.      Mouth/Throat:      Mouth: Mucous membranes are moist.      Pharynx: Oropharynx is clear.   Eyes:      Conjunctiva/sclera: Conjunctivae normal.      Pupils: Pupils are equal, round, and reactive to light.   Neck:      Thyroid: No thyromegaly.      Vascular: Normal carotid pulses. No carotid bruit.      Trachea: Trachea normal.   Cardiovascular:      Rate and Rhythm: Normal rate and regular rhythm.      Pulses:           Dorsalis pedis pulses are 3+ on the right side and 3+ on the left side.        Posterior tibial pulses are 3+ on the right side and 3+ on the left side.      Heart sounds: Normal heart sounds.   Pulmonary:      Effort: Pulmonary effort is normal. No respiratory distress.      Breath sounds: Normal breath sounds.   Abdominal:      General: Bowel sounds are normal.      Palpations: Abdomen is soft.      Tenderness: There is no abdominal tenderness.   Musculoskeletal:      Cervical back: Neck supple.      Right lower leg: No edema.      Left lower leg: No edema.      Right foot: Normal range of motion. No deformity or bunion.      Left foot: Normal range of motion. No deformity or bunion.   Feet:      Right foot:      Protective Sensation: 10 sites tested.  10 sites sensed.      Skin integrity: Dry skin present. No ulcer, blister, erythema, warmth, callus or fissure.      Toenail Condition: Right toenails are abnormally thick. Fungal disease present.     Left foot:      Protective Sensation: 10 sites tested.  10 sites sensed.      Skin integrity: Dry skin present. No ulcer, blister, erythema, warmth, callus or fissure.      Toenail Condition: Left toenails are abnormally thick.      Comments: Bruising noted beneath L great toenail (jammed it under a door), no redness, no skin breakdown.  Lymphadenopathy:      Cervical: No cervical adenopathy.       Upper Body:      Right upper body: No supraclavicular adenopathy.      Left upper body: No supraclavicular adenopathy.   Skin:     General: Skin is warm and dry.      Capillary Refill: Capillary refill takes less than 2 seconds.      Findings: No rash.   Neurological:      General: No focal deficit present.      Mental Status: He is alert and oriented to person, place, and time.   Psychiatric:         Mood and Affect: Mood normal.         Behavior: Behavior normal.        Assessment:          ICD-10-CM ICD-9-CM   1. Type 2 diabetes mellitus with hyperglycemia, without long-term current use of insulin  E11.65 250.00     790.29   2. Benign essential hypertension  I10 401.1   3. Prostate cancer screening  Z12.5 V76.44   4. Encounter for long-term (current) use of other medications  Z79.899 V58.69   5. Screening for malignant neoplasm of colon  Z12.11 V76.51   6. Need for shingles vaccine  Z23 V04.89   7. Hypercholesteremia  E78.00 272.0   8. Body mass index (BMI) 28.0-28.9, adult  Z68.28 V85.24        Plan:       Type 2 diabetes mellitus with hyperglycemia, without long-term current use of insulin  Comments:  not controlled  Orders:  -     Comprehensive Metabolic Panel; Future; Expected date: 01/05/2023  -     Microalbumin/Creatinine Ratio, Urine; Future; Expected date: 01/05/2023  -     Hemoglobin A1C; Future; Expected date: 01/05/2023  -     dapagliflozin-metformin (XIGDUO XR) 5-1,000 mg; Take 1 tablet by mouth Daily.  Dispense: 90 tablet; Refill: 1    Benign essential hypertension  Comments:  controlled  Orders:  -     lisinopriL (PRINIVIL,ZESTRIL) 20 MG tablet; Take 1 tablet (20 mg total) by mouth once daily.  Dispense: 90 tablet; Refill: 1    Prostate cancer screening  -     PSA, Screening; Future; Expected date: 01/05/2023    Encounter for long-term (current) use of other medications  -     CBC Auto Differential; Future; Expected date: 01/05/2023  -     Comprehensive Metabolic Panel; Future; Expected date:  01/05/2023  -     Lipid Panel; Future; Expected date: 01/05/2023  -     TSH; Future; Expected date: 01/05/2023    Screening for malignant neoplasm of colon  -     Ambulatory referral/consult to Gastroenterology; Future; Expected date: 02/06/2023    Need for shingles vaccine  -     Zoster Recombinant Vaccine    Hypercholesteremia  Comments:  just started taking statin 1/4/23 pm  Orders:  -     pravastatin (PRAVACHOL) 10 MG tablet; Take 1 tablet (10 mg total) by mouth once daily.  Dispense: 90 tablet; Refill: 3    Body mass index (BMI) 28.0-28.9, adult        Current Outpatient Medications:     dapagliflozin-metformin (XIGDUO XR) 5-1,000 mg, Take 1 tablet by mouth Daily., Disp: 90 tablet, Rfl: 1    lisinopriL (PRINIVIL,ZESTRIL) 20 MG tablet, Take 1 tablet (20 mg total) by mouth once daily., Disp: 90 tablet, Rfl: 1    pravastatin (PRAVACHOL) 10 MG tablet, Take 1 tablet (10 mg total) by mouth once daily., Disp: 90 tablet, Rfl: 3    New & refilled meds:  Requested Prescriptions     Signed Prescriptions Disp Refills    pravastatin (PRAVACHOL) 10 MG tablet 90 tablet 3     Sig: Take 1 tablet (10 mg total) by mouth once daily.    dapagliflozin-metformin (XIGDUO XR) 5-1,000 mg 90 tablet 1     Sig: Take 1 tablet by mouth Daily.    lisinopriL (PRINIVIL,ZESTRIL) 20 MG tablet 90 tablet 1     Sig: Take 1 tablet (20 mg total) by mouth once daily.     COVID vaccine declined  Shingrix #1 today & 2nd at next appt  Eye exam SHAYY for 2022 report from [facility]  Prevnar-20 - [facility] [place] in 2 wks for shot only  Flu shot declined  10 yr f/u screening colonoscopy due next month - referral to Dr. Sykes entered.  Continue current meds unless changes needed after lab review.  Lab results and schedule of future lab studies reviewed with patient.  Reviewed diet, exercise and weight control.    Return to clinic 2 wks Prevnar-20 (shot only); 4 mths f/u uncontrolled T2DM, HLD, w/ fasting labs; and f/u as needed.    Future Appointments   Date Time Provider  Department Center   1/19/2023  2:00 PM NURSE, [facility] FAMILY MEDICINE [facility] ABDIAZIZ Moy   5/18/2023  9:40 AM ANABELLA Cruz [facility] ABDIAZIZ Moy          Signature:  ANABELLA Cruz

## 2023-01-05 NOTE — TELEPHONE ENCOUNTER
----- Message from Cadence Vee sent at 1/5/2023  2:49 PM CST -----  Pts wife called and stated that he got the [facility] mixed up when he was here and is asking that [facility]

## 2023-01-09 NOTE — PROGRESS NOTES
A1c is even higher than 1 year ago, FPG is way too high, and uncontrolled DM is affecting his kidney fxn.  Increase Xigduo XR to 5/1000 mg to 2 tabs dly (can split up to 1 tab 2x/day if better tolerated).  I would recommend also adding a GLP1, but he stopped Rybelsus in the past d/t adverse side effects & wouldn't even try Ozempic, Trulicity, or Victoza.  I don't think increasing Xigduo alone will be enough, so could add glipizide ER 5 mg w/ bfast.  PSA is also increased, so will rx 28 day abx then need to recheck PSA & if still high will refer to Uro.  Haven't sent any meds yet.

## 2023-01-10 DIAGNOSIS — E11.65 TYPE 2 DIABETES MELLITUS WITH HYPERGLYCEMIA, WITHOUT LONG-TERM CURRENT USE OF INSULIN: ICD-10-CM

## 2023-01-10 DIAGNOSIS — N41.1 CHRONIC PROSTATITIS: Primary | ICD-10-CM

## 2023-01-10 DIAGNOSIS — R97.20 PSA ELEVATION: ICD-10-CM

## 2023-01-10 RX ORDER — GLIPIZIDE 5 MG/1
5 TABLET, FILM COATED, EXTENDED RELEASE ORAL
Qty: 90 TABLET | Refills: 1 | Status: SHIPPED | OUTPATIENT
Start: 2023-01-10 | End: 2023-02-28 | Stop reason: SDUPTHER

## 2023-01-10 RX ORDER — CIPROFLOXACIN 500 MG/1
500 TABLET ORAL EVERY 12 HOURS
Qty: 56 TABLET | Refills: 0 | Status: SHIPPED | OUTPATIENT
Start: 2023-01-10 | End: 2023-02-07

## 2023-01-19 ENCOUNTER — CLINICAL SUPPORT (OUTPATIENT)
Dept: FAMILY MEDICINE | Facility: CLINIC | Age: 68
End: 2023-01-19
Payer: COMMERCIAL

## 2023-01-19 DIAGNOSIS — Z23 NEED FOR PROPHYLACTIC VACCINATION WITH STREPTOCOCCUS PNEUMONIAE (PNEUMOCOCCUS) AND INFLUENZA VACCINES: Primary | ICD-10-CM

## 2023-01-19 PROCEDURE — 90471 PNEUMOCOCCAL CONJUGATE VACCINE 20-VALENT: ICD-10-PCS | Mod: ,,, | Performed by: NURSE PRACTITIONER

## 2023-01-19 PROCEDURE — 90471 IMMUNIZATION ADMIN: CPT | Mod: ,,, | Performed by: NURSE PRACTITIONER

## 2023-01-19 PROCEDURE — 90677 PCV20 VACCINE IM: CPT | Mod: ,,, | Performed by: NURSE PRACTITIONER

## 2023-01-19 PROCEDURE — 90677 PNEUMOCOCCAL CONJUGATE VACCINE 20-VALENT: ICD-10-PCS | Mod: ,,, | Performed by: NURSE PRACTITIONER

## 2023-02-01 DIAGNOSIS — E11.65 TYPE 2 DIABETES MELLITUS WITH HYPERGLYCEMIA, WITHOUT LONG-TERM CURRENT USE OF INSULIN: Chronic | ICD-10-CM

## 2023-02-01 RX ORDER — DAPAGLIFLOZIN AND METFORMIN HYDROCHLORIDE 5; 1000 MG/1; MG/1
2 TABLET, FILM COATED, EXTENDED RELEASE ORAL DAILY
Qty: 180 TABLET | Refills: 3 | Status: SHIPPED | OUTPATIENT
Start: 2023-02-01 | End: 2023-06-25

## 2023-02-28 ENCOUNTER — TELEPHONE (OUTPATIENT)
Dept: FAMILY MEDICINE | Facility: CLINIC | Age: 68
End: 2023-02-28
Payer: COMMERCIAL

## 2023-02-28 DIAGNOSIS — I10 BENIGN ESSENTIAL HYPERTENSION: Chronic | ICD-10-CM

## 2023-02-28 DIAGNOSIS — E11.65 TYPE 2 DIABETES MELLITUS WITH HYPERGLYCEMIA, WITHOUT LONG-TERM CURRENT USE OF INSULIN: ICD-10-CM

## 2023-02-28 RX ORDER — LISINOPRIL 20 MG/1
20 TABLET ORAL DAILY
Qty: 90 TABLET | Refills: 1 | Status: SHIPPED | OUTPATIENT
Start: 2023-02-28 | End: 2023-11-02

## 2023-02-28 RX ORDER — GLIPIZIDE 5 MG/1
5 TABLET, FILM COATED, EXTENDED RELEASE ORAL
Qty: 90 TABLET | Refills: 1 | Status: SHIPPED | OUTPATIENT
Start: 2023-02-28 | End: 2023-08-23

## 2023-02-28 NOTE — TELEPHONE ENCOUNTER
----- Message from Christy Sykes sent at 2/28/2023  1:25 PM CST -----  PT Need refill on glipiZIDE 5 MG  and lisinopriL  [facility] PT  # [phone]

## 2023-03-01 ENCOUNTER — TELEPHONE (OUTPATIENT)
Dept: FAMILY MEDICINE | Facility: CLINIC | Age: 68
End: 2023-03-01
Payer: COMMERCIAL

## 2023-03-01 NOTE — TELEPHONE ENCOUNTER
Pt is not having any type of prostate or any other type of issues.  Explain he was only suppose to take for 28 due to his prostate infection

## 2023-03-01 NOTE — TELEPHONE ENCOUNTER
This is an antibiotic and I do not typically refill antibiotics.  Took 28 days of treatment which is the recommended treatment for prostate infection.  Why is he asking for a refill is he having issues?

## 2023-03-01 NOTE — TELEPHONE ENCOUNTER
----- Message from Christy Sykes sent at 3/1/2023  1:47 PM CST -----   Pt Need refill on Ciprofloxacin 500mg  sent in to [facility]  PT # [phone]

## 2023-04-11 ENCOUNTER — TELEPHONE (OUTPATIENT)
Dept: FAMILY MEDICINE | Facility: CLINIC | Age: 68
End: 2023-04-11
Payer: COMMERCIAL

## 2023-04-11 DIAGNOSIS — E11.65 TYPE 2 DIABETES MELLITUS WITH HYPERGLYCEMIA, WITHOUT LONG-TERM CURRENT USE OF INSULIN: Chronic | ICD-10-CM

## 2023-04-11 NOTE — TELEPHONE ENCOUNTER
----- Message from Christy Sykes sent at 4/11/2023  2:36 PM CDT -----  Pt need refill on XIGDUO XR sent in [facility]  Pt # [phone]

## 2023-04-18 DIAGNOSIS — Z12.11 COLON CANCER SCREENING: Primary | ICD-10-CM

## 2023-04-18 RX ORDER — POLYETHYLENE GLYCOL 3350, SODIUM SULFATE ANHYDROUS, SODIUM BICARBONATE, SODIUM CHLORIDE, POTASSIUM CHLORIDE 236; 22.74; 6.74; 5.86; 2.97 G/4L; G/4L; G/4L; G/4L; G/4L
4 POWDER, FOR SOLUTION ORAL ONCE
Qty: 4000 ML | Refills: 0 | Status: SHIPPED | OUTPATIENT
Start: 2023-04-18 | End: 2023-04-18

## 2023-04-18 NOTE — TELEPHONE ENCOUNTER
----- Message from Elizabeth Leigh sent at 4/18/2023  8:55 AM CDT -----  Regarding: Colon prep  This pt has an appt Friday and needs his prep sent in to [facility] in [place]

## 2023-04-21 ENCOUNTER — ANESTHESIA EVENT (OUTPATIENT)
Dept: GASTROENTEROLOGY | Facility: HOSPITAL | Age: 68
End: 2023-04-21
Payer: COMMERCIAL

## 2023-04-21 ENCOUNTER — ANESTHESIA (OUTPATIENT)
Dept: GASTROENTEROLOGY | Facility: HOSPITAL | Age: 68
End: 2023-04-21
Payer: COMMERCIAL

## 2023-04-21 ENCOUNTER — HOSPITAL ENCOUNTER (OUTPATIENT)
Dept: GASTROENTEROLOGY | Facility: HOSPITAL | Age: 68
Discharge: HOME | End: 2023-04-21
Attending: INTERNAL MEDICINE
Payer: COMMERCIAL

## 2023-04-21 VITALS
RESPIRATION RATE: 16 BRPM | DIASTOLIC BLOOD PRESSURE: 75 MMHG | OXYGEN SATURATION: 99 % | HEART RATE: 76 BPM | SYSTOLIC BLOOD PRESSURE: 108 MMHG | TEMPERATURE: 98 F

## 2023-04-21 DIAGNOSIS — Z12.11 COLON CANCER SCREENING: ICD-10-CM

## 2023-04-21 LAB — GLUCOSE SERPL-MCNC: 168 MG/DL (ref 70–105)

## 2023-04-21 PROCEDURE — 88305 TISSUE EXAM BY PATHOLOGIST: CPT | Mod: TC,SUR | Performed by: INTERNAL MEDICINE

## 2023-04-21 PROCEDURE — 82962 GLUCOSE BLOOD TEST: CPT

## 2023-04-21 PROCEDURE — 45385 PR COLONOSCOPY,REMV LESN,SNARE: ICD-10-PCS | Mod: 33,,, | Performed by: INTERNAL MEDICINE

## 2023-04-21 PROCEDURE — 45380 PR COLONOSCOPY,BIOPSY: ICD-10-PCS | Mod: 33,59,, | Performed by: INTERNAL MEDICINE

## 2023-04-21 PROCEDURE — 37000009 HC ANESTHESIA EA ADD 15 MINS

## 2023-04-21 PROCEDURE — 63600175 PHARM REV CODE 636 W HCPCS: Performed by: NURSE ANESTHETIST, CERTIFIED REGISTERED

## 2023-04-21 PROCEDURE — D9220A PRA ANESTHESIA: ICD-10-PCS | Mod: 33,,, | Performed by: NURSE ANESTHETIST, CERTIFIED REGISTERED

## 2023-04-21 PROCEDURE — 27000284 HC CANNULA NASAL: Performed by: NURSE ANESTHETIST, CERTIFIED REGISTERED

## 2023-04-21 PROCEDURE — 45385 COLONOSCOPY W/LESION REMOVAL: CPT | Mod: 33,,, | Performed by: INTERNAL MEDICINE

## 2023-04-21 PROCEDURE — 25000003 PHARM REV CODE 250: Performed by: NURSE ANESTHETIST, CERTIFIED REGISTERED

## 2023-04-21 PROCEDURE — 45385 COLONOSCOPY W/LESION REMOVAL: CPT | Mod: PT | Performed by: INTERNAL MEDICINE

## 2023-04-21 PROCEDURE — 88305 SURGICAL PATHOLOGY: ICD-10-PCS | Mod: 26,,, | Performed by: PATHOLOGY

## 2023-04-21 PROCEDURE — 37000008 HC ANESTHESIA 1ST 15 MINUTES

## 2023-04-21 PROCEDURE — 88305 TISSUE EXAM BY PATHOLOGIST: CPT | Mod: 26,,, | Performed by: PATHOLOGY

## 2023-04-21 PROCEDURE — 27201423 OPTIME MED/SURG SUP & DEVICES STERILE SUPPLY

## 2023-04-21 PROCEDURE — D9220A PRA ANESTHESIA: Mod: 33,,, | Performed by: NURSE ANESTHETIST, CERTIFIED REGISTERED

## 2023-04-21 PROCEDURE — 45380 COLONOSCOPY AND BIOPSY: CPT | Mod: 33,59,, | Performed by: INTERNAL MEDICINE

## 2023-04-21 PROCEDURE — 45380 COLONOSCOPY AND BIOPSY: CPT | Mod: PT,59 | Performed by: INTERNAL MEDICINE

## 2023-04-21 RX ORDER — LIDOCAINE HYDROCHLORIDE 20 MG/ML
INJECTION INTRAVENOUS
Status: DISCONTINUED | OUTPATIENT
Start: 2023-04-21 | End: 2023-04-21

## 2023-04-21 RX ORDER — PROPOFOL 10 MG/ML
VIAL (ML) INTRAVENOUS
Status: DISCONTINUED | OUTPATIENT
Start: 2023-04-21 | End: 2023-04-21

## 2023-04-21 RX ADMIN — LIDOCAINE HYDROCHLORIDE 50 MG: 20 INJECTION, SOLUTION INTRAVENOUS at 09:04

## 2023-04-21 RX ADMIN — PROPOFOL 50 MG: 10 INJECTION, EMULSION INTRAVENOUS at 10:04

## 2023-04-21 RX ADMIN — SODIUM CHLORIDE: 9 INJECTION, SOLUTION INTRAVENOUS at 09:04

## 2023-04-21 RX ADMIN — PROPOFOL 100 MG: 10 INJECTION, EMULSION INTRAVENOUS at 09:04

## 2023-04-21 NOTE — DISCHARGE INSTRUCTIONS
Procedure Date  4/21/23     Impression  Overall Impression:   - 4 subcentimeter polyps removed with forceps and cold snare polypectomy  - Grade II internal hemorrhoids  - The exam was otherwise normal        Recommendation    Await pathology results to determine interval (5-7 years)      Outcome of procedure: successful Colonoscopy  Disposition: patient to recovery following procedure; discharge to home when appropriate parameters met  Provisions for follow up: please call my office for any unexpected symptoms like chest or abdominal pain or bleeding following your procedure.  Final Diagnosis: colon polyps    Do not drive, operate machinery, signing legal documents until tomorrow.

## 2023-04-21 NOTE — H&P
Gastroenterology Pre-procedure H&P    Chief Complaint: Colon cancer screening    History of Present Illness    Vishal Grace is a 67 y.o. male that  has a past medical history of COVID-19 (08/20/2021), Deficiency of testosterone biosynthesis, Diabetes mellitus, type 2, Hypertension, Long term (current) use of oral hypoglycemic drugs, Other long term (current) drug therapy, Personal history of COVID-19 (8/20/2021), Personal history of malignant melanoma of skin (1998), and Personal history of malignant neoplasm of other parts of nervous tissue (04/2015).     Patient here for routine surveillance    Patient denies wt loss, abdominal pain, diarrhea, melena/hematochezia, change in stool caliber, no anticoagulants, FMHx of GI related malignancies.      Past Medical History:   Diagnosis Date    COVID-19 08/20/2021    Deficiency of testosterone biosynthesis     Diabetes mellitus, type 2     Hypertension     Long term (current) use of oral hypoglycemic drugs     Other long term (current) drug therapy     Personal history of COVID-19 8/20/2021    Personal history of malignant melanoma of skin 1998    Personal history of malignant neoplasm of other parts of nervous tissue 04/2015    Referred to Dr. 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; had re-excision of the area 6/4/15 without residual tumor noted; had 30 radiation treatments between 8/4/15-9/15/2015       Past Surgical History:   Procedure Laterality Date    EXCISION OF MELANOMA  1998    TUMOR EXCISION         Family History   Problem Relation Age of Onset    Stroke Mother     Heart disease Father         heart failure    No Known Problems Son        Social History     Socioeconomic History    Marital status:    Tobacco Use    Smoking status: Never    Smokeless tobacco: Never   Substance and Sexual Activity    Alcohol use: Not Currently    Drug use: Never     Sexual activity: Yes       Current Outpatient Medications   Medication Sig Dispense Refill    dapagliflozin-metformin (XIGDUO XR) 5-1,000 mg Take 2 tablets by mouth Daily. 180 tablet 3    glipiZIDE 5 MG TR24 Take 1 tablet (5 mg total) by mouth daily with breakfast. 90 tablet 1    lisinopriL (PRINIVIL,ZESTRIL) 20 MG tablet Take 1 tablet (20 mg total) by mouth once daily. 90 tablet 1    pravastatin (PRAVACHOL) 10 MG tablet Take 1 tablet (10 mg total) by mouth once daily. 90 tablet 3     No current facility-administered medications for this encounter.       Review of patient's allergies indicates:   Allergen Reactions    Pineapple Hives       Objective:  Vitals:    04/21/23 0916   BP: 125/81   Pulse: 65   Resp: 12   SpO2: 99%        GEN: normal appearing, NAD, AAO x3  HENT: NCAT, anicteric, OP benign  CV: normal rate, regular rhythm  RESP: NABS, symmetric rise, unlabored  ABD: soft, ND, no guarding or TTP  SKIN: warm and dry  NEURO: grossly afocal    Assessment and Plan:    Proceed with:    Colonoscopy for previous adenomatous polyp, screening for colon cancer    Reese Sykes MD  Gastroenterology

## 2023-04-21 NOTE — ANESTHESIA PREPROCEDURE EVALUATION
04/21/2023  Vishal Grace is a 67 y.o., male.      Pre-op Assessment    I have reviewed the Patient Summary Reports.     I have reviewed the Nursing Notes. I have reviewed the NPO Status.   I have reviewed the Medications.     Review of Systems  Anesthesia Hx:  No problems with previous Anesthesia    Hematology/Oncology:  Hematology Normal   Oncology Normal     EENT/Dental:EENT/Dental Normal   Cardiovascular:   Hypertension    Pulmonary:  Pulmonary Normal    Renal/:  Renal/ Normal     Hepatic/GI:  Hepatic/GI Normal    Musculoskeletal:  Musculoskeletal Normal    Neurological:  Neurology Normal    Endocrine:   Diabetes    Dermatological:  Skin Normal    Psych:  Psychiatric Normal         Past Medical History:   Diagnosis Date    COVID-19 08/20/2021    Deficiency of testosterone biosynthesis     Diabetes mellitus, type 2     Hypertension     Long term (current) use of oral hypoglycemic drugs     Other long term (current) drug therapy     Personal history of COVID-19 8/20/2021    Personal history of malignant melanoma of skin 1998    Personal history of malignant neoplasm of other parts of nervous tissue 04/2015    Referred to Dr. 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; had re-excision of the area 6/4/15 without residual tumor noted; had 30 radiation treatments between 8/4/15-9/15/2015       Past Surgical History:   Procedure Laterality Date    EXCISION OF MELANOMA  1998    TUMOR EXCISION             Social History     Socioeconomic History    Marital status:    Tobacco Use    Smoking status: Never    Smokeless tobacco: Never   Substance and Sexual Activity    Alcohol use: Not Currently    Drug use: Never    Sexual activity: Yes       Current Outpatient Medications   Medication  Sig Dispense Refill    dapagliflozin-metformin (XIGDUO XR) 5-1,000 mg Take 2 tablets by mouth Daily. 180 tablet 3    glipiZIDE 5 MG TR24 Take 1 tablet (5 mg total) by mouth daily with breakfast. 90 tablet 1    lisinopriL (PRINIVIL,ZESTRIL) 20 MG tablet Take 1 tablet (20 mg total) by mouth once daily. 90 tablet 1    pravastatin (PRAVACHOL) 10 MG tablet Take 1 tablet (10 mg total) by mouth once daily. 90 tablet 3     No current facility-administered medications for this encounter.       Review of patient's allergies indicates:   Allergen Reactions    Pineapple Hives       Chemistry        Component Value Date/Time     01/05/2023 0810    K 4.3 01/05/2023 0810     (H) 01/05/2023 0810    CO2 23 01/05/2023 0810    BUN 23 (H) 01/05/2023 0810    CREATININE 1.33 (H) 01/05/2023 0810     (H) 01/05/2023 0810        Component Value Date/Time    CALCIUM 9.3 01/05/2023 0810    ALKPHOS 79 01/05/2023 0810    AST 16 01/05/2023 0810    ALT 33 01/05/2023 0810    BILITOT 0.6 01/05/2023 0810    EGFRNONAA 65 01/11/2022 1133        Lab Results   Component Value Date    WBC 6.84 01/05/2023    HGB 17.0 01/05/2023    HCT 49.9 01/05/2023     01/05/2023     No results found for this or any previous visit.    Physical Exam  General: Well nourished, Cooperative, Alert and Oriented    Airway:  Mallampati: II   Mouth Opening: Normal  TM Distance: Normal  Tongue: Normal  Neck ROM: Normal ROM    Dental:  Intact        Anesthesia Plan  Type of Anesthesia, risks & benefits discussed:    Anesthesia Type: Gen Natural Airway  Intra-op Monitoring Plan: Standard ASA Monitors  Post Op Pain Control Plan: multimodal analgesia  Induction:  IV  Informed Consent: Informed consent signed with the Patient and all parties understand the risks and agree with anesthesia plan.  All questions answered. Patient consented to blood products? Yes  ASA Score: 3  Day of Surgery Review of History & Physical: H&P Update referred to the  surgeon/provider.    Ready For Surgery From Anesthesia Perspective.     .

## 2023-04-21 NOTE — TRANSFER OF CARE
Anesthesia Transfer of Care Note    Patient: Vishal Grace    Procedure(s) Performed: * No procedures listed *    Patient location: GI    Anesthesia Type: general    Transport from OR: Transported from OR on room air with adequate spontaneous ventilation. Continuous ECG monitoring in transport. Continuous SpO2 monitoring in transport    Post pain: adequate analgesia    Post assessment: no apparent anesthetic complications and tolerated procedure well    Post vital signs: stable    Level of consciousness: responds to stimulation    Nausea/Vomiting: no nausea/vomiting    Complications: none    Transfer of care protocol was followed      Last vitals:   Visit Vitals  /82 (BP Location: Left arm, Patient Position: Lying)   Pulse 65   Temp 36.7 °C (98 °F)   Resp (!) 23   SpO2 98%

## 2023-04-21 NOTE — ANESTHESIA POSTPROCEDURE EVALUATION
Anesthesia Post Evaluation    Patient: Vishal Grace    Procedure(s) Performed: * No procedures listed *    Final Anesthesia Type: general      Patient location during evaluation: GI PACU  Patient participation: Yes- Able to Participate  Level of consciousness: awake and alert  Post-procedure vital signs: reviewed and stable  Pain management: adequate  Airway patency: patent    PONV status at discharge: No PONV  Anesthetic complications: no      Cardiovascular status: stable  Respiratory status: unassisted, spontaneous ventilation and room air  Hydration status: euvolemic  Follow-up not needed.          Vitals Value Taken Time   /83 04/21/23 1045   Temp 98 04/21/23 1124   Pulse 66 04/21/23 1045   Resp 11 04/21/23 1045   SpO2 98 % 04/21/23 1045   Vitals shown include unvalidated device data.      Event Time   Out of Recovery 10:48:00         Pain/Lisette Score: Lisette Score: 8 (4/21/2023 10:20 AM)

## 2023-04-24 LAB
ESTROGEN SERPL-MCNC: NORMAL PG/ML
INSULIN SERPL-ACNC: NORMAL U[IU]/ML
LAB AP GROSS DESCRIPTION: NORMAL
LAB AP LABORATORY NOTES: NORMAL
T3RU NFR SERPL: NORMAL %

## 2023-04-24 NOTE — PROGRESS NOTES
Mrs. Young, thank you for referring this patient to me. I recommend repeat colonoscopy in 7 years. Please let me know if you have any questions regarding this patient.

## 2023-05-18 ENCOUNTER — OFFICE VISIT (OUTPATIENT)
Dept: FAMILY MEDICINE | Facility: CLINIC | Age: 68
End: 2023-05-18
Payer: COMMERCIAL

## 2023-05-18 VITALS
HEART RATE: 70 BPM | OXYGEN SATURATION: 97 % | WEIGHT: 171 LBS | RESPIRATION RATE: 20 BRPM | DIASTOLIC BLOOD PRESSURE: 75 MMHG | BODY MASS INDEX: 27.48 KG/M2 | SYSTOLIC BLOOD PRESSURE: 107 MMHG | HEIGHT: 66 IN | TEMPERATURE: 99 F

## 2023-05-18 DIAGNOSIS — R97.20 PSA ELEVATION: ICD-10-CM

## 2023-05-18 DIAGNOSIS — Z23 NEED FOR SHINGLES VACCINE: ICD-10-CM

## 2023-05-18 DIAGNOSIS — I10 BENIGN ESSENTIAL HYPERTENSION: Chronic | ICD-10-CM

## 2023-05-18 DIAGNOSIS — E11.65 TYPE 2 DIABETES MELLITUS WITH HYPERGLYCEMIA, WITHOUT LONG-TERM CURRENT USE OF INSULIN: Primary | Chronic | ICD-10-CM

## 2023-05-18 LAB
ANION GAP SERPL CALCULATED.3IONS-SCNC: 7 MMOL/L (ref 7–16)
BUN SERPL-MCNC: 26 MG/DL (ref 7–18)
BUN/CREAT SERPL: 23 (ref 6–20)
CALCIUM SERPL-MCNC: 9.3 MG/DL (ref 8.5–10.1)
CHLORIDE SERPL-SCNC: 110 MMOL/L (ref 98–107)
CO2 SERPL-SCNC: 26 MMOL/L (ref 21–32)
CREAT SERPL-MCNC: 1.15 MG/DL (ref 0.7–1.3)
EGFR (NO RACE VARIABLE) (RUSH/TITUS): 70 ML/MIN/1.73M2
EST. AVERAGE GLUCOSE BLD GHB EST-MCNC: 114 MG/DL
GLUCOSE SERPL-MCNC: 113 MG/DL (ref 74–106)
HBA1C MFR BLD HPLC: 6 % (ref 4.5–6.6)
POTASSIUM SERPL-SCNC: 4.2 MMOL/L (ref 3.5–5.1)
PSA SERPL-MCNC: 3.31 NG/ML
SODIUM SERPL-SCNC: 139 MMOL/L (ref 136–145)

## 2023-05-18 PROCEDURE — 80048 BASIC METABOLIC PANEL: ICD-10-PCS | Mod: ,,, | Performed by: CLINICAL MEDICAL LABORATORY

## 2023-05-18 PROCEDURE — 90750 ZOSTER RECOMBINANT VACCINE: ICD-10-PCS | Mod: ,,, | Performed by: NURSE PRACTITIONER

## 2023-05-18 PROCEDURE — 90471 IMMUNIZATION ADMIN: CPT | Mod: ,,, | Performed by: NURSE PRACTITIONER

## 2023-05-18 PROCEDURE — 83036 HEMOGLOBIN GLYCOSYLATED A1C: CPT | Mod: ,,, | Performed by: CLINICAL MEDICAL LABORATORY

## 2023-05-18 PROCEDURE — 80048 BASIC METABOLIC PNL TOTAL CA: CPT | Mod: ,,, | Performed by: CLINICAL MEDICAL LABORATORY

## 2023-05-18 PROCEDURE — 90471 ZOSTER RECOMBINANT VACCINE: ICD-10-PCS | Mod: ,,, | Performed by: NURSE PRACTITIONER

## 2023-05-18 PROCEDURE — 84153 ASSAY OF PSA TOTAL: CPT | Mod: ,,, | Performed by: CLINICAL MEDICAL LABORATORY

## 2023-05-18 PROCEDURE — 84153 PSA, TOTAL (DIAGNOSTIC): ICD-10-PCS | Mod: ,,, | Performed by: CLINICAL MEDICAL LABORATORY

## 2023-05-18 PROCEDURE — 99214 PR OFFICE/OUTPT VISIT, EST, LEVL IV, 30-39 MIN: ICD-10-PCS | Mod: 25,,, | Performed by: NURSE PRACTITIONER

## 2023-05-18 PROCEDURE — 83036 HEMOGLOBIN A1C: ICD-10-PCS | Mod: ,,, | Performed by: CLINICAL MEDICAL LABORATORY

## 2023-05-18 PROCEDURE — 99214 OFFICE O/P EST MOD 30 MIN: CPT | Mod: 25,,, | Performed by: NURSE PRACTITIONER

## 2023-05-18 PROCEDURE — 90750 HZV VACC RECOMBINANT IM: CPT | Mod: ,,, | Performed by: NURSE PRACTITIONER

## 2023-05-18 RX ORDER — TAMSULOSIN HYDROCHLORIDE 0.4 MG/1
0.4 CAPSULE ORAL DAILY
Qty: 90 CAPSULE | Refills: 3 | Status: SHIPPED | OUTPATIENT
Start: 2023-05-18 | End: 2024-05-17

## 2023-05-18 NOTE — PROGRESS NOTES
"[facility]       PATIENT NAME: Vishal Grace   : 1955    AGE: 67 y.o. DATE OF ENCOUNTER: 23    MRN: [id number]      PCP: ANABELLA Cruz    Reason for Visit / Chief Complaint:  Follow-up, Diabetes, and Hyperlipidemia (Patient presents to the clinic 4m f/u hldand dm)         274}    Subjective:     HPI:    Presents for 4 mth f/u uncontrolled T2DM and PSA elevation.  Last visit, A1c had increased to 8.9%.  Pt is resistant to starting injectable med & had severe GI side effects w/ Rybelsus, so increased Xigduo to 2 tabs daily and added glipizide ER with breakfast. He has been taking only 1/2 tab xigduo & 1 tab glipizide at bfast & 1 tab Xigduo w/ supper.   Checking glucose 2x/wk - "good', reports 117, 123. Max 168 when he couldn't take meds prior to cscope.  Still hasn't updated diabetic eye exam.    Treated with 28 day antibiotic we will plan to recheck PSA today and refer to Urology if still elevated.  Reports slow stream, nocturia.    Review of Systems:   Review of Systems   Constitutional: Negative.    HENT: Negative.     Respiratory: Negative.     Cardiovascular: Negative.    Gastrointestinal: Negative.    Genitourinary:  Positive for difficulty urinating (slow, weak stream; nocturia 2x/day) and frequency.   Skin: Negative.    Neurological: Negative.    Psychiatric/Behavioral: Negative.       Allergies and Meds: 274}     Review of patient's allergies indicates:   Allergen Reactions    Pineapple Hives        Current Outpatient Medications:     dapagliflozin-metformin (XIGDUO XR) 5-1,000 mg, Take 2 tablets by mouth Daily. (Patient taking differently: Take 2 tablets by mouth Daily. Has been taking 1 1/2 pills daily), Disp: 180 tablet, Rfl: 3    glipiZIDE 5 MG TR24, Take 1 tablet (5 mg total) by mouth daily with breakfast., Disp: 90 tablet, Rfl: 1    lisinopriL (PRINIVIL,ZESTRIL) 20 MG tablet, Take 1 tablet (20 mg total) by mouth once daily., Disp: 90 tablet, " Rfl: 1    pravastatin (PRAVACHOL) 10 MG tablet, Take 1 tablet (10 mg total) by mouth once daily., Disp: 90 tablet, Rfl: 3    tamsulosin (FLOMAX) 0.4 mg Cap, Take 1 capsule (0.4 mg total) by mouth once daily., Disp: 90 capsule, Rfl: 3    Labs:274}    I have reviewed old labs below:  Lab Results   Component Value Date    WBC 6.84 01/05/2023    RBC 5.45 01/05/2023    HGB 17.0 01/05/2023    HCT 49.9 01/05/2023     01/05/2023     01/05/2023    K 4.3 01/05/2023     (H) 01/05/2023    CALCIUM 9.3 01/05/2023     (H) 01/05/2023    BUN 23 (H) 01/05/2023    CREATININE 1.33 (H) 01/05/2023    EGFRNONAA 65 01/11/2022    ALT 33 01/05/2023    AST 16 01/05/2023    CHOL 157 01/05/2023    TRIG 128 01/05/2023    HDL 41 01/05/2023    LDLCALC 90 01/05/2023    TSH 3.210 01/05/2023    PSA 4.260 (H) 01/05/2023    HGBA1C 8.9 (H) 01/05/2023    MICROALBUR 0.5 01/05/2023       Medical History: 274}     Past Medical History:   Diagnosis Date    COVID-19 08/20/2021    Deficiency of testosterone biosynthesis     Diabetes mellitus, type 2     Hypertension     Long term (current) use of oral hypoglycemic drugs     Other long term (current) drug therapy     Personal history of COVID-19 8/20/2021    Personal history of malignant melanoma of skin 1998    Personal history of malignant neoplasm of other parts of nervous tissue 04/2015    Referred to Dr. 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; had re-excision of the area 6/4/15 without residual tumor noted; had 30 radiation treatments between 8/4/15-9/15/2015      Social History     Tobacco Use   Smoking Status Never   Smokeless Tobacco Never      Past Surgical History:   Procedure Laterality Date    EXCISION OF MELANOMA  1998    TUMOR EXCISION        Objective:  274}   /75 (BP Location: Left arm, Patient Position: Sitting, BP Method: Medium (Automatic))   Pulse 70   " Temp 98.7 °F (37.1 °C) (Oral)   Resp 20   Ht 5' 6" (1.676 m)   Wt 77.6 kg (171 lb)   SpO2 97%   BMI 27.60 kg/m²     Wt Readings from Last 3 Encounters:   05/18/23 77.6 kg (171 lb)   01/05/23 81.2 kg (179 lb)   04/30/22 80.7 kg (178 lb)     BP Readings from Last 3 Encounters:   05/18/23 107/75   04/21/23 108/75   01/05/23 130/80     Body mass index is 27.6 kg/m².     Physical Exam  Vitals and nursing note reviewed.   Constitutional:       General: He is not in acute distress.     Appearance: Normal appearance.   HENT:      Head: Normocephalic.   Eyes:      Conjunctiva/sclera: Conjunctivae normal.   Neck:      Thyroid: No thyromegaly or thyroid tenderness.      Trachea: Trachea normal.   Cardiovascular:      Rate and Rhythm: Normal rate and regular rhythm.      Pulses: Normal pulses.      Heart sounds: Normal heart sounds.   Pulmonary:      Effort: Pulmonary effort is normal. No respiratory distress.      Breath sounds: Normal breath sounds.   Musculoskeletal:      Cervical back: Neck supple.      Right lower leg: No edema.      Left lower leg: No edema.   Lymphadenopathy:      Cervical: No cervical adenopathy.   Skin:     General: Skin is warm and dry.   Neurological:      Mental Status: He is alert and oriented to person, place, and time.   Psychiatric:         Mood and Affect: Mood normal.         Behavior: Behavior normal.        Assessment and Plan: 274}     1. Type 2 diabetes mellitus with hyperglycemia, without long-term current use of insulin  Comments:  not controlled  shouldn't be spitting xigduo XR - can take 1 tab 2x/day if prefers instead of once dly  cont glipizide ER 5 mg dly  Stressed to update eye exam.  Orders:  -     Basic Metabolic Panel; Future; Expected date: 05/18/2023  -     Hemoglobin A1C; Future; Expected date: 05/18/2023    2. Benign essential hypertension  Comments:  Controlled, continue lisinopril.    3. PSA elevation  Comments:  Start Flomax for LUTS.  Recheck PSA today and refer " to Urology if still elevated.  Orders:  -     PSA, Total (Diagnostic); Future; Expected date: 05/18/2023  -     tamsulosin (FLOMAX) 0.4 mg Cap; Take 1 capsule (0.4 mg total) by mouth once daily.  Dispense: 90 capsule; Refill: 3    4. Need for shingles vaccine  -     Zoster Recombinant Vaccine       Return to clinic 4-mth f/u uncontrolled T2DM, nonfasting; and sooner as needed.    Future Appointments   Date Time Provider Department Center   9/21/2023  1:20 PM ANABELLA Cruz [facility] ABDIAZ[facility] Moy          Signature:  ANABELLA Cruz

## 2023-05-23 NOTE — PROGRESS NOTES
DM control is much better since adding glipizide.  He can take just 1 Xigduo XR daily instead of going to 2 tabs. GFR is better since DM control has improved also. PSA now in normal range; hopefully the flomax helps with his other symptoms.

## 2023-06-08 ENCOUNTER — OFFICE VISIT (OUTPATIENT)
Dept: FAMILY MEDICINE | Facility: CLINIC | Age: 68
End: 2023-06-08
Payer: COMMERCIAL

## 2023-06-08 VITALS
SYSTOLIC BLOOD PRESSURE: 119 MMHG | HEIGHT: 66 IN | TEMPERATURE: 98 F | HEART RATE: 87 BPM | OXYGEN SATURATION: 97 % | WEIGHT: 177 LBS | DIASTOLIC BLOOD PRESSURE: 80 MMHG | BODY MASS INDEX: 28.45 KG/M2 | RESPIRATION RATE: 20 BRPM

## 2023-06-08 DIAGNOSIS — M79.662 PAIN OF LEFT CALF: Primary | ICD-10-CM

## 2023-06-08 PROCEDURE — 99213 OFFICE O/P EST LOW 20 MIN: CPT | Mod: ,,, | Performed by: NURSE PRACTITIONER

## 2023-06-08 PROCEDURE — 99213 PR OFFICE/OUTPT VISIT, EST, LEVL III, 20-29 MIN: ICD-10-PCS | Mod: ,,, | Performed by: NURSE PRACTITIONER

## 2023-06-08 RX ORDER — MELOXICAM 15 MG/1
15 TABLET ORAL DAILY
Qty: 30 TABLET | Refills: 0 | Status: SHIPPED | OUTPATIENT
Start: 2023-06-08 | End: 2024-02-07

## 2023-06-08 NOTE — PROGRESS NOTES
ANABELLA Daniels   [facility]      PATIENT NAME: Vishal Grace  : 1955  DATE: 23  MRN: [id number]      Patient PCP Information       Provider PCP Type    ANABELLA Cruz [facility]            Reason for Visit / Chief Complaint: Leg Pain (Rm 1/Left leg pain x 2-3 weeks. When walking more it helps with pain. (Burns, makes feet tingle.) )       History of Present Illness / Problem Focused Workflow     Vishal Grace presents to the clinic with Leg Pain (Rm 1/Left leg pain x 2-3 weeks. When walking more it helps with pain. (Burns, makes feet tingle.) )     HPI  Patient presents to clinic with recent left lateral calf burning pain.   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  Denies known injury. Denies swelling or erythema.  Pain intermittent. Ongoing for last 2-3 weeks.   Patient is a . 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 Worse with mvmt.  Hx of diabetes. A1C improved on Xigduo from 8.9 to 6.0          Review of Systems     Review of Systems   Constitutional:  Negative for activity change, appetite change, chills, diaphoresis, fatigue, fever and unexpected weight change.   HENT:  Negative for congestion, ear pain, facial swelling, hearing loss, nosebleeds and sore throat.    Eyes: Negative.    Respiratory:  Negative for apnea, cough, shortness of breath and wheezing.    Cardiovascular:  Negative for chest pain, palpitations and leg swelling.   Gastrointestinal:  Negative for abdominal distention, abdominal pain, blood in stool, constipation, diarrhea and nausea.   Endocrine: Negative for cold intolerance, heat intolerance, polydipsia, polyphagia and polyuria.   Genitourinary:  Negative for decreased urine volume, difficulty urinating, dysuria, flank pain, frequency, hematuria and urgency.   Musculoskeletal:  Positive for myalgias. Negative for arthralgias and joint swelling.   Skin:  Negative for color change and rash.    Allergic/Immunologic: Negative.    Neurological:  Negative for dizziness, tremors, seizures, syncope, facial asymmetry, speech difficulty, weakness, light-headedness, numbness and headaches.   Hematological:  Negative for adenopathy. Does not bruise/bleed easily.   Psychiatric/Behavioral:  Negative for behavioral problems and confusion.      Medical / Social / Family History     Past Medical History:   Diagnosis Date    COVID-19 08/20/2021    Deficiency of testosterone biosynthesis     Diabetes mellitus, type 2     Hypertension     Long term (current) use of oral hypoglycemic drugs     Other long term (current) drug therapy     Personal history of COVID-19 8/20/2021    Personal history of malignant melanoma of skin 1998    Personal history of malignant neoplasm of other parts of nervous tissue 04/2015    Referred to Dr. 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 He has never been exposed to tobacco smoke. He has never used smokeless tobacco. He reports that he does not currently use alcohol. He reports that he does not use drugs.    Family History  's family history includes Heart disease in his father; No Known Problems in his son; Stroke in his mother.    Medications and Allergies     Medications  Outpatient Medications Marked as Taking for the 6/8/23 encounter (Office Visit) with ANABELLA Daniels   Medication Sig Dispense Refill    dapagliflozin-metformin (XIGDUO XR) 5-1,000 mg Take 2 tablets by mouth Daily. (Patient taking differently: Take 2 tablets by mouth Daily. Has been taking 1 1/2 pills daily) 180 tablet 3     "glipiZIDE 5 MG TR24 Take 1 tablet (5 mg total) by mouth daily with breakfast. 90 tablet 1    lisinopriL (PRINIVIL,ZESTRIL) 20 MG tablet Take 1 tablet (20 mg total) by mouth once daily. 90 tablet 1    pravastatin (PRAVACHOL) 10 MG tablet Take 1 tablet (10 mg total) by mouth once daily. 90 tablet 3    tamsulosin (FLOMAX) 0.4 mg Cap Take 1 capsule (0.4 mg total) by mouth once daily. 90 capsule 3       Allergies  Review of patient's allergies indicates:   Allergen Reactions    Pineapple Hives       Physical Examination     Vitals:    06/08/23 1302   BP: 119/80   BP Location: Left arm   Patient Position: Sitting   BP Method: Medium (Automatic)   Pulse: 87   Resp: 20   Temp: 98.4 °F (36.9 °C)   TempSrc: Oral   SpO2: 97%   Weight: 80.3 kg (177 lb)   Height: 5' 6" (1.676 m)       Physical Exam  Vitals and nursing note reviewed.   Constitutional:       Appearance: Normal appearance. He is normal weight.   HENT:      Head: Normocephalic.      Right Ear: External ear normal.      Left Ear: External ear normal.      Nose: Nose normal.      Mouth/Throat:      Mouth: Mucous membranes are moist.   Eyes:      Extraocular Movements: Extraocular movements intact.      Conjunctiva/sclera: Conjunctivae normal.      Pupils: Pupils are equal, round, and reactive to light.   Cardiovascular:      Rate and Rhythm: Normal rate and regular rhythm.      Pulses: Normal pulses.      Heart sounds: Normal heart sounds. No murmur heard.  Pulmonary:      Effort: Pulmonary effort is normal.      Breath sounds: Normal breath sounds. No stridor. No wheezing or rhonchi.   Abdominal:      General: Bowel sounds are normal. There is no distension.      Palpations: Abdomen is soft. There is no mass.      Tenderness: There is no abdominal tenderness.   Musculoskeletal:         General: No swelling, tenderness, deformity or signs of injury. Normal range of motion.      Cervical back: Normal range of motion and neck supple.      Right lower leg: No edema.      " Left lower leg: No edema.   Skin:     General: Skin is warm and dry.      Capillary Refill: Capillary refill takes less than 2 seconds.      Findings: No bruising or erythema.   Neurological:      General: No focal deficit present.      Mental Status: He is alert. Mental status is at baseline.      Cranial Nerves: No cranial nerve deficit.      Sensory: No sensory deficit.      Motor: No weakness.   Psychiatric:         Mood and Affect: Mood normal.         Behavior: Behavior normal.         Thought Content: Thought content normal.         Judgment: Judgment normal.         No visits with results within 14 Day(s) from this visit.   Latest known visit with results is:   Office Visit on 05/18/2023   Component Date Value Ref Range Status    Sodium 05/18/2023 139  136 - 145 mmol/L Final    Potassium 05/18/2023 4.2  3.5 - 5.1 mmol/L Final    Chloride 05/18/2023 110 (H)  98 - 107 mmol/L Final    CO2 05/18/2023 26  21 - 32 mmol/L Final    Anion Gap 05/18/2023 7  7 - 16 mmol/L Final    Glucose 05/18/2023 113 (H)  74 - 106 mg/dL Final    BUN 05/18/2023 26 (H)  7 - 18 mg/dL Final    Creatinine 05/18/2023 1.15  0.70 - 1.30 mg/dL Final    BUN/Creatinine Ratio 05/18/2023 23 (H)  6 - 20 Final    Calcium 05/18/2023 9.3  8.5 - 10.1 mg/dL Final    eGFR 05/18/2023 70  >=60 mL/min/1.73m2 Final    Hemoglobin A1C 05/18/2023 6.0  4.5 - 6.6 % Final      Normal:               <5.7%  Pre-Diabetic:       5.7% to 6.4%  Diabetic:             >6.4%  Diabetic Goal:     <7%    Estimated Average Glucose 05/18/2023 114  mg/dL Final    PSA Total 05/18/2023 3.310  <=4.000 ng/mL Final    Performed by Siemens Chemiluminescent [place] Immunoassay (biotinylated anti-TPSA monoclonal antibody fragment).             Assessment and Plan (including Health Maintenance)       Plan:   Pain of left calf  -     US Lower Extremity Veins Bilateral; Future; Expected date: 06/08/2023  -     meloxicam (MOBIC) 15 MG tablet; Take 1 tablet (15 mg total) by mouth once  daily.  Dispense: 30 tablet; Refill: 0  -     X-Ray Knee 1 or 2 View Left; Future; Expected date: 06/08/2023     Follow up with imaging studies.   Increase oral hydration, potassium last month stable  RTC if pain not improved with NSAIDs  There are no Patient Instructions on file for this visit.       Health Maintenance Due   Topic Date Due    Eye Exam  10/18/2022       Most Recent Immunizations   Administered Date(s) Administered    Pneumococcal Conjugate - 20 Valent 01/19/2023    Pneumococcal Polysaccharide - 23 Valent 01/11/2022    Tdap 05/25/2021    Zoster Recombinant 05/18/2023        Problem List Items Addressed This Visit    None  Visit Diagnoses       Pain of left calf    -  Primary    Relevant Medications    meloxicam (MOBIC) 15 MG tablet    Other Relevant Orders    US Lower Extremity Veins Bilateral    X-Ray Knee 1 or 2 View Left            Health Maintenance Topics with due status: Not Due       Topic Last Completion Date    TETANUS VACCINE 05/25/2021    Diabetes Urine Screening 01/05/2023    Foot Exam 01/05/2023    Lipid Panel 01/05/2023    Colorectal Cancer Screening 04/21/2023    PROSTATE-SPECIFIC ANTIGEN 05/18/2023    Hemoglobin A1c 05/18/2023    Low Dose Statin 06/08/2023       Future Appointments   Date Time Provider Department Center   6/16/2023  2:00 PM [facility]   9/21/2023  1:20 PM Elaine Young [facility]            Signature:  ANABELLA Daniels  [facility]     Date of encounter: 6/8/23

## 2023-06-16 ENCOUNTER — HOSPITAL ENCOUNTER (OUTPATIENT)
Dept: RADIOLOGY | Facility: HOSPITAL | Age: 68
Discharge: HOME | End: 2023-06-16
Attending: NURSE PRACTITIONER
Payer: COMMERCIAL

## 2023-06-16 DIAGNOSIS — M79.662 PAIN OF LEFT CALF: ICD-10-CM

## 2023-06-16 PROCEDURE — 93970 US LOWER EXTREMITY VEINS BILATERAL: ICD-10-PCS | Mod: 26,,, | Performed by: RADIOLOGY

## 2023-06-16 PROCEDURE — 73560 XR KNEE 1 OR 2 VIEW LEFT: ICD-10-PCS | Mod: 26,LT,, | Performed by: RADIOLOGY

## 2023-06-16 PROCEDURE — 73560 X-RAY EXAM OF KNEE 1 OR 2: CPT | Mod: 26,LT,, | Performed by: RADIOLOGY

## 2023-06-16 PROCEDURE — 93970 EXTREMITY STUDY: CPT | Mod: 26,,, | Performed by: RADIOLOGY

## 2023-06-16 PROCEDURE — 93970 EXTREMITY STUDY: CPT | Mod: TC

## 2023-06-16 PROCEDURE — 73560 X-RAY EXAM OF KNEE 1 OR 2: CPT | Mod: TC,LT

## 2023-06-19 DIAGNOSIS — I72.4 ANEURYSM OF RIGHT POPLITEAL ARTERY: Primary | ICD-10-CM

## 2023-06-24 DIAGNOSIS — E11.65 TYPE 2 DIABETES MELLITUS WITH HYPERGLYCEMIA, WITHOUT LONG-TERM CURRENT USE OF INSULIN: Chronic | ICD-10-CM

## 2023-06-25 RX ORDER — DAPAGLIFLOZIN AND METFORMIN HYDROCHLORIDE 5; 1000 MG/1; MG/1
1 TABLET, FILM COATED, EXTENDED RELEASE ORAL EVERY MORNING
Qty: 90 TABLET | Refills: 3 | Status: ON HOLD | OUTPATIENT
Start: 2023-06-25 | End: 2023-09-18 | Stop reason: SDUPTHER

## 2023-07-06 ENCOUNTER — CLINICAL SUPPORT (OUTPATIENT)
Dept: PRIMARY CARE CLINIC | Facility: CLINIC | Age: 68
End: 2023-07-06

## 2023-07-06 DIAGNOSIS — Z02.4 ENCOUNTER FOR DEPARTMENT OF TRANSPORTATION (DOT) EXAMINATION FOR DRIVING LICENSE RENEWAL: Primary | ICD-10-CM

## 2023-07-06 PROCEDURE — 99499 UNLISTED E&M SERVICE: CPT | Mod: ,,, | Performed by: NURSE PRACTITIONER

## 2023-07-06 PROCEDURE — 99499 PR PHYSICAL - DOT/CDL: ICD-10-PCS | Mod: ,,, | Performed by: NURSE PRACTITIONER

## 2023-07-06 NOTE — PROGRESS NOTES
Subjective     Patient ID: Vishal Grace is a 67 y.o. male.    Chief Complaint: No chief complaint on file.    HPI  Review of Systems       Objective     Physical Exam       Assessment and Plan     1. Encounter for Department of Transportation (DOT) examination for driving license renewal        See scanned documents in .            No follow-ups on file.

## 2023-07-27 ENCOUNTER — OFFICE VISIT (OUTPATIENT)
Dept: SURGERY | Facility: CLINIC | Age: 68
End: 2023-07-27
Payer: COMMERCIAL

## 2023-07-27 VITALS — OXYGEN SATURATION: 98 % | HEART RATE: 97 BPM | HEIGHT: 66 IN | WEIGHT: 175 LBS | BODY MASS INDEX: 28.12 KG/M2

## 2023-07-27 DIAGNOSIS — I73.9 PVD (PERIPHERAL VASCULAR DISEASE): Primary | ICD-10-CM

## 2023-07-27 DIAGNOSIS — I72.4 POPLITEAL ANEURYSM: ICD-10-CM

## 2023-07-27 DIAGNOSIS — I73.9 PERIPHERAL VASCULAR DISEASE, UNSPECIFIED: ICD-10-CM

## 2023-07-27 PROCEDURE — 99203 OFFICE O/P NEW LOW 30 MIN: CPT | Mod: S$PBB,,, | Performed by: SURGERY

## 2023-07-27 PROCEDURE — 99203 PR OFFICE/OUTPT VISIT, NEW, LEVL III, 30-44 MIN: ICD-10-PCS | Mod: S$PBB,,, | Performed by: SURGERY

## 2023-07-27 PROCEDURE — 99213 OFFICE O/P EST LOW 20 MIN: CPT | Mod: PBBFAC | Performed by: SURGERY

## 2023-07-27 NOTE — PROGRESS NOTES
"Subjective:       Patient ID: Vishal Grace is a 67 y.o. male.    Chief Complaint: Consult (Popliteal artery anuerysm)  Patient got a venous duplex for some mild swelling found an incidental right popliteal artery aneurysms 1.3 x 1 cm.  No family history of aneurysms previously nonsmoker.  He is had no previous vascular procedures or interventions.  His exam is unremarkable  family history includes Heart disease in his father; No Known Problems in his son; Stroke in his mother.  Past Medical History:   Diagnosis Date    COVID-19 08/20/2021    Deficiency of testosterone biosynthesis     Diabetes mellitus, type 2     Hypertension     Long term (current) use of oral hypoglycemic drugs     Other long term (current) drug therapy     Personal history of COVID-19 8/20/2021    Personal history of malignant melanoma of skin 1998    Personal history of malignant neoplasm of other parts of nervous tissue 04/2015    Referred to Dr. 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 He has never been exposed to tobacco smoke. He has never used smokeless tobacco. He reports that he does not currently use alcohol. He reports that he does not use drugs.   HPI  Review of Systems      Objective:      Pulse 97   Ht 5' 6" (1.676 m)   Wt 79.4 kg (175 lb)   SpO2 98%   BMI 28.25 kg/m²    Physical Exam  Constitutional:       Appearance: Normal appearance.   Cardiovascular:      Rate and Rhythm: Normal rate.      Pulses:           Radial pulses are 2+ on the right side and 2+ on the left side.        Popliteal pulses are 1+ on the right side and 1+ on the left side.        " Posterior tibial pulses are 1+ on the right side and 1+ on the left side.   Skin:     Capillary Refill: Capillary refill takes less than 2 seconds.   Neurological:      General: No focal deficit present.      Mental Status: He is alert.   Psychiatric:         Mood and Affect: Mood normal.         Behavior: Behavior normal.         Thought Content: Thought content normal.         Assessment:       1. PVD (peripheral vascular disease)    2. Popliteal aneurysm    3. Peripheral vascular disease, unspecified        Plan:       CTA with runoff, arterial Dopplers with ABIs exercise if needed

## 2023-08-16 ENCOUNTER — HOSPITAL ENCOUNTER (OUTPATIENT)
Dept: RADIOLOGY | Facility: HOSPITAL | Age: 68
Discharge: HOME | End: 2023-08-16
Attending: NURSE PRACTITIONER
Payer: COMMERCIAL

## 2023-08-16 DIAGNOSIS — I73.9 PERIPHERAL VASCULAR DISEASE, UNSPECIFIED: ICD-10-CM

## 2023-08-16 DIAGNOSIS — I73.9 PVD (PERIPHERAL VASCULAR DISEASE): ICD-10-CM

## 2023-08-16 LAB — CREAT SERPL-MCNC: 1.3 MG/DL (ref 0.6–1.3)

## 2023-08-16 PROCEDURE — 25500020 PHARM REV CODE 255: Performed by: NURSE PRACTITIONER

## 2023-08-16 PROCEDURE — 75635 CTA RUNOFF ABD PEL BILAT LOWER EXT: ICD-10-PCS | Mod: 26,,, | Performed by: RADIOLOGY

## 2023-08-16 PROCEDURE — 75635 CT ANGIO ABDOMINAL ARTERIES: CPT | Mod: TC

## 2023-08-16 PROCEDURE — 93923 UPR/LXTR ART STDY 3+ LVLS: CPT | Mod: 26,,, | Performed by: SURGERY

## 2023-08-16 PROCEDURE — 75635 CT ANGIO ABDOMINAL ARTERIES: CPT | Mod: 26,,, | Performed by: RADIOLOGY

## 2023-08-16 PROCEDURE — 93923 UPR/LXTR ART STDY 3+ LVLS: CPT | Mod: TC

## 2023-08-16 PROCEDURE — 93923 US ARTERIAL DOPPLER EXAM: ICD-10-PCS | Mod: 26,,, | Performed by: SURGERY

## 2023-08-16 PROCEDURE — 82565 ASSAY OF CREATININE: CPT

## 2023-08-16 RX ADMIN — IOPAMIDOL 125 ML: 755 INJECTION, SOLUTION INTRAVENOUS at 02:08

## 2023-08-23 DIAGNOSIS — E11.65 TYPE 2 DIABETES MELLITUS WITH HYPERGLYCEMIA, WITHOUT LONG-TERM CURRENT USE OF INSULIN: ICD-10-CM

## 2023-08-23 RX ORDER — GLIPIZIDE 5 MG/1
5 TABLET, FILM COATED, EXTENDED RELEASE ORAL
Qty: 90 TABLET | Refills: 1 | Status: ON HOLD | OUTPATIENT
Start: 2023-08-23 | End: 2023-09-18 | Stop reason: SDUPTHER

## 2023-08-24 ENCOUNTER — OFFICE VISIT (OUTPATIENT)
Dept: SURGERY | Facility: CLINIC | Age: 68
End: 2023-08-24
Payer: COMMERCIAL

## 2023-08-24 VITALS — BODY MASS INDEX: 28.12 KG/M2 | OXYGEN SATURATION: 97 % | WEIGHT: 175 LBS | RESPIRATION RATE: 18 BRPM | HEIGHT: 66 IN

## 2023-08-24 DIAGNOSIS — I72.4 POPLITEAL ANEURYSM: Primary | ICD-10-CM

## 2023-08-24 PROCEDURE — 99214 PR OFFICE/OUTPT VISIT, EST, LEVL IV, 30-39 MIN: ICD-10-PCS | Mod: S$PBB,,, | Performed by: SURGERY

## 2023-08-24 PROCEDURE — 99214 OFFICE O/P EST MOD 30 MIN: CPT | Mod: S$PBB,,, | Performed by: SURGERY

## 2023-08-24 PROCEDURE — 99215 OFFICE O/P EST HI 40 MIN: CPT | Mod: PBBFAC | Performed by: SURGERY

## 2023-08-24 RX ORDER — SODIUM CHLORIDE 9 MG/ML
INJECTION, SOLUTION INTRAVENOUS CONTINUOUS
Status: CANCELLED | OUTPATIENT
Start: 2023-08-24

## 2023-08-24 NOTE — PATIENT INSTRUCTIONS
[facility]      Your surgery is scheduled for 9/18 at [facility] on the ground floor of the Ambulatory building. You should arrive at 6:00 at the Ambulatory Care Center located at [address].                                                                                                                                                                                                                                                                                                                                                                                                                                                                                                   Day of Surgery Instructions      Bring a list of all your medications with you the day of your surgery. You can also give the list to your doctor or nurse during your final clinic appointment before surgery.    Stop taking all herbal medications 14 days prior to surgery.  Stop drinking alcoholic beverages for 24 hours before surgery. Do not drink alcohol for 24 hours after surgery.  Eat a light supper on the night before your surgery.  Do not eat any solid foods or drink any liquids after 12:00 AM (midnight). This includes gum, hard candy, mints, and chewing tobacco.  Medications: Take any medications specified with a small sip of water the morning of your surgery.  Brush your teeth: You may brush your teeth and rinse your mouth. Do not swallow any water or toothpaste.  Clothing: A button front shirt and loose-fitting clothes are the most comfortable before and after surgery. We also recommend low-heeled shoes.  Hair: Avoid buns, ponytails, or hairpieces at the back of the head. Remove or avoid any clips, pins or bands that bind hair. Do not use hairspray. Before going to surgery, you will need to remove any wigs or hairpieces.  We will cover your hair during surgery. Your privacy regarding personal appearance will be  respected.  Fingernails: Please be sure to remove all nail polish before you arrive for surgery. We understand that tips, wraps, gels, etc., are expensive; however, we ask these products to be removed from at least one finger on each hand. Your fingertips are used to accurately monitor your oxygen level during surgery by a device called an oximeter.  Glasses and Contact Lenses: Wear glasses when possible. If contact lenses must be worn, bring a lens case and solution. If glasses are worn, bring a case for them.  Hearing Aids: If you rely on a hearing aid, wear it to the hospital on the day of surgery. This will ensure you can hear and understand everything we need to communicate with you.  Valuables: Jewelry, including body piercings, Dentures, money, and credit cards should be left at home. [facility] is not responsible for valuables that are not secured in our surgery center.  Makeup, Perfume, Creams, Lotions and Deodorants: Do not use any of these products on the day of surgery. Remove false eyelashes prior to surgery.  Implanted Medical Devices: If you have an implanted device, such as a pacemaker or AICD, bring the device information card (if you have it) with you.  Medical Equipment: If you have been fitted for a brace to wear after surgery or you have been given crutches, bring those with you to the surgery center.  Shower: Take a shower with Hibiclens® (chlorhexidine) (available over the counter). This reduces the chance of infection. PLEASE USE CHLORHEXIDINE WASH THE NIGHT BEFORE SURGERY AND THE MORNING OF SURGERY.      If you are diabetic      Follow the diabetic medicine instructions you received during your pre-operative visit.  DO NOT take your insulin or diabetic medications the morning of surgery.  When you arrive at the surgical center, be sure to tell the nurse you are diabetic.            Other Items to bring with you and know      Insurance card  Identification card such as 's license,  passport, or other picture ID  Copy of your advance directives  List of medications and allergies, if not already provided  Name and phone number of person to contact if your condition changes significantly. YOU CANNOT DRIVE YOURSELF HOME FROM THE HOSPITAL THE DAY OF SURGERY.  PLEASE UNDERSTAND THAT OUR OFFICE DOES NOT GIVE PATHOLOGY RESULTS OR TEST RESULTS OVER THE PHONE. THIS WILL BE DISCUSSED WITH YOU ON YOUR FOLLOW UP APPOINTMENT.        Medication instructions:  You may take blood pressure medication with a small drink of water the morning of surgery.    The following blood sugar medications have to be stopped prior to surgery:    Metformin, Glucovance, Metaglip, Fortamet, Glucophage, Riomet, Avandamet, Glimepiride    IF YOU ARE ON ANY OF THESE BLOOD THINNERS, MAKE SURE YOUR PHYSICIAN IS AWARE.  Eliquis/Apixaban         Wafarin/Coumadin,Jantoven  Xarelto/Rivaroxaban   Pletal/Cilostazol  Plavix/Clopidogrel                                                              Pradaxa/Dibigatran        Alcohol and Surgery  We want to help you prepare for and recover from surgery as quickly and safely as possible. Be open and honest with your provider about how many drinks you have per day. Excessive alcohol use is defined as drinking more than three drinks per day. It can affect the outcome of your surgery. Binge drinking (consuming large amounts of alcohol infrequently, such as on weekends) can also affect the outcome of your surgery.  Alcohol withdrawal  If you drink more than three drinks a day, you could have a complication, called alcohol withdrawal, after surgery.  Alcohol withdrawal is a set of symptoms that people have when they suddenly stop drinking after using alcohol  for a long time. During withdrawal, a person's central nervous system overreacts. This can cause mild symptoms such as shakiness, sweating or hallucinating. It can also cause other more serious side effects. If not treated properly, alcohol  withdrawal can cause potentially life-threatening complications after surgery. This can include tremors, seizures, hallucinations, delirium tremors, and even death. Untreated alcohol withdrawal often leads to a longer stay in the hospital, potentially in the Intensive Care Unit.  Chronic heavy drinking also can interfere with several organ systems and biochemical processes in the body.  This interference can cause serious, even life-threatening complications.  Your care team can offer alcohol withdrawal treatment to help:  Decrease the risk of seizures and delirium tremors after surgery  Decrease the risk we will need to restrain you for your own safety or the safety of others  Decrease your risk of falling after surgery  Reduce the use of potent sedative medications  Reduce the time you stay in the hospital after surgery  Reduce the time you might spend on a mechanical ventilator to help you breathe  Lower incidence of organ failure and biochemical complications  Talk to a member of your care team or your primary care physician about your alcohol use if you feel you may be at risk of any of these complications.        Smoking and Surgery  Quitting smoking is extremely important for a successful surgery and recovery. Cigarette smoking compromises your immune system. This increases your risk of an infection after surgery. Quitting the habit before surgery will decrease the surgical risks associated with smoking.

## 2023-08-24 NOTE — H&P (VIEW-ONLY)
Subjective:       Patient ID: Vishal Grace is a 67 y.o. male.    Chief Complaint: Follow-up (Patient is here for a follow up on a CTA scan that was done on 08/16/2023. Patient denied any pain or discomfort at this time .)  Established patient presents in follow-up reviewed CT angio runoff looks excellent.  His ABIs are normal.  Does have a 14 mm aneurysms involving the popliteal artery bit unusual appears somewhat saccular in origin discussed with the patient main issues include thrombosis acute limb ischemia in the face of excellent runoff feel a VIABAHN its excellent choice for treatment of this he understands he will require some form of prior lifelong anticoagulation and we covered the risk of bleeding infection possible limb loss  family history includes Heart disease in his father; No Known Problems in his son; Stroke in his mother.  Past Medical History:   Diagnosis Date    COVID-19 08/20/2021    Deficiency of testosterone biosynthesis     Diabetes mellitus, type 2     Hypertension     Long term (current) use of oral hypoglycemic drugs     Other long term (current) drug therapy     Personal history of COVID-19 8/20/2021    Personal history of malignant melanoma of skin 1998    Personal history of malignant neoplasm of other parts of nervous tissue 04/2015    Referred to Dr. 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 He has never been exposed to tobacco smoke. He has never used smokeless tobacco. He reports that he does not currently use alcohol. He reports that he does not use drugs.   HPI  Review of Systems      Objective:      Resp 18   Ht 5'  "6" (1.676 m)   Wt 79.4 kg (175 lb)   SpO2 97%   BMI 28.25 kg/m²    Physical Exam  Exam conducted with a chaperone present.   Constitutional:       Appearance: Normal appearance.   Cardiovascular:      Rate and Rhythm: Normal rate.      Pulses: Normal pulses.   Pulmonary:      Effort: Pulmonary effort is normal.   Abdominal:      General: Abdomen is flat.   Skin:     Capillary Refill: Capillary refill takes less than 2 seconds.   Neurological:      General: No focal deficit present.      Mental Status: He is alert.   Psychiatric:         Mood and Affect: Mood normal.         Behavior: Behavior normal.         Thought Content: Thought content normal.         Judgment: Judgment normal.           Assessment:       1. Popliteal aneurysm        Plan:       VIABAHN covered graft for treatment right popliteal artery aneurysms      "

## 2023-09-18 ENCOUNTER — HOSPITAL ENCOUNTER (OUTPATIENT)
Facility: HOSPITAL | Age: 68
LOS: 1 days | Discharge: HOME | End: 2023-09-18
Attending: SURGERY | Admitting: SURGERY
Payer: COMMERCIAL

## 2023-09-18 ENCOUNTER — ANESTHESIA (OUTPATIENT)
Dept: SURGERY | Facility: HOSPITAL | Age: 68
End: 2023-09-18
Payer: COMMERCIAL

## 2023-09-18 ENCOUNTER — ANESTHESIA EVENT (OUTPATIENT)
Dept: SURGERY | Facility: HOSPITAL | Age: 68
End: 2023-09-18
Payer: COMMERCIAL

## 2023-09-18 VITALS
SYSTOLIC BLOOD PRESSURE: 115 MMHG | HEART RATE: 82 BPM | RESPIRATION RATE: 14 BRPM | OXYGEN SATURATION: 96 % | TEMPERATURE: 98 F | DIASTOLIC BLOOD PRESSURE: 74 MMHG

## 2023-09-18 DIAGNOSIS — I72.4 POPLITEAL ANEURYSM: Primary | ICD-10-CM

## 2023-09-18 DIAGNOSIS — E11.65 TYPE 2 DIABETES MELLITUS WITH HYPERGLYCEMIA, WITHOUT LONG-TERM CURRENT USE OF INSULIN: ICD-10-CM

## 2023-09-18 DIAGNOSIS — E11.65 TYPE 2 DIABETES MELLITUS WITH HYPERGLYCEMIA, WITHOUT LONG-TERM CURRENT USE OF INSULIN: Chronic | ICD-10-CM

## 2023-09-18 DIAGNOSIS — I72.4 POPLITEAL ANEURYSM: ICD-10-CM

## 2023-09-18 DIAGNOSIS — I73.9 PVD (PERIPHERAL VASCULAR DISEASE): Primary | ICD-10-CM

## 2023-09-18 DIAGNOSIS — I25.10 CAD (CORONARY ARTERY DISEASE): ICD-10-CM

## 2023-09-18 LAB
ANION GAP SERPL CALCULATED.3IONS-SCNC: 10 MMOL/L (ref 7–16)
BASOPHILS # BLD AUTO: 0.02 K/UL (ref 0–0.2)
BASOPHILS NFR BLD AUTO: 0.3 % (ref 0–1)
BUN SERPL-MCNC: 25 MG/DL (ref 7–18)
BUN/CREAT SERPL: 19 (ref 6–20)
CALCIUM SERPL-MCNC: 9 MG/DL (ref 8.5–10.1)
CHLORIDE SERPL-SCNC: 111 MMOL/L (ref 98–107)
CO2 SERPL-SCNC: 23 MMOL/L (ref 21–32)
CREAT SERPL-MCNC: 1.29 MG/DL (ref 0.7–1.3)
DIFFERENTIAL METHOD BLD: ABNORMAL
EGFR (NO RACE VARIABLE) (RUSH/TITUS): 61 ML/MIN/1.73M2
EOSINOPHIL # BLD AUTO: 0.27 K/UL (ref 0–0.5)
EOSINOPHIL NFR BLD AUTO: 4 % (ref 1–4)
ERYTHROCYTE [DISTWIDTH] IN BLOOD BY AUTOMATED COUNT: 13.4 % (ref 11.5–14.5)
GLUCOSE SERPL-MCNC: 133 MG/DL (ref 70–105)
GLUCOSE SERPL-MCNC: 161 MG/DL (ref 70–105)
GLUCOSE SERPL-MCNC: 161 MG/DL (ref 74–106)
HCT VFR BLD AUTO: 48.5 % (ref 40–54)
HGB BLD-MCNC: 16.7 G/DL (ref 13.5–18)
IMM GRANULOCYTES # BLD AUTO: 0.02 K/UL (ref 0–0.04)
IMM GRANULOCYTES NFR BLD: 0.3 % (ref 0–0.4)
INDIRECT COOMBS: NORMAL
LYMPHOCYTES # BLD AUTO: 1.33 K/UL (ref 1–4.8)
LYMPHOCYTES NFR BLD AUTO: 19.6 % (ref 27–41)
MCH RBC QN AUTO: 31.5 PG (ref 27–31)
MCHC RBC AUTO-ENTMCNC: 34.4 G/DL (ref 32–36)
MCV RBC AUTO: 91.5 FL (ref 80–96)
MONOCYTES # BLD AUTO: 0.62 K/UL (ref 0–0.8)
MONOCYTES NFR BLD AUTO: 9.1 % (ref 2–6)
MPC BLD CALC-MCNC: 9.5 FL (ref 9.4–12.4)
NEUTROPHILS # BLD AUTO: 4.52 K/UL (ref 1.8–7.7)
NEUTROPHILS NFR BLD AUTO: 66.7 % (ref 53–65)
NRBC # BLD AUTO: 0 X10E3/UL
NRBC, AUTO (.00): 0 %
PLATELET # BLD AUTO: 163 K/UL (ref 150–400)
POTASSIUM SERPL-SCNC: 4.4 MMOL/L (ref 3.5–5.1)
RBC # BLD AUTO: 5.3 M/UL (ref 4.6–6.2)
RH BLD: NORMAL
SODIUM SERPL-SCNC: 140 MMOL/L (ref 136–145)
SPECIMEN OUTDATE: NORMAL
WBC # BLD AUTO: 6.78 K/UL (ref 4.5–11)

## 2023-09-18 PROCEDURE — 93010 ELECTROCARDIOGRAM REPORT: CPT | Mod: ,,, | Performed by: INTERNAL MEDICINE

## 2023-09-18 PROCEDURE — 93005 ELECTROCARDIOGRAM TRACING: CPT

## 2023-09-18 PROCEDURE — D9220A PRA ANESTHESIA: Mod: CRNA,,, | Performed by: NURSE ANESTHETIST, CERTIFIED REGISTERED

## 2023-09-18 PROCEDURE — 93010 EKG 12-LEAD: ICD-10-PCS | Mod: ,,, | Performed by: INTERNAL MEDICINE

## 2023-09-18 PROCEDURE — C1769 GUIDE WIRE: HCPCS | Performed by: SURGERY

## 2023-09-18 PROCEDURE — 37236 PR OPEN/PERQ TRANSCATH PLACE STENT; INITIAL ARTERY: ICD-10-PCS | Mod: RT,,, | Performed by: SURGERY

## 2023-09-18 PROCEDURE — C1725 CATH, TRANSLUMIN NON-LASER: HCPCS | Performed by: SURGERY

## 2023-09-18 PROCEDURE — D9220A PRA ANESTHESIA: ICD-10-PCS | Mod: CRNA,,, | Performed by: NURSE ANESTHETIST, CERTIFIED REGISTERED

## 2023-09-18 PROCEDURE — 25500020 PHARM REV CODE 255: Performed by: SURGERY

## 2023-09-18 PROCEDURE — 71000016 HC POSTOP RECOV ADDL HR: Performed by: SURGERY

## 2023-09-18 PROCEDURE — 37000009 HC ANESTHESIA EA ADD 15 MINS: Performed by: SURGERY

## 2023-09-18 PROCEDURE — 71000015 HC POSTOP RECOV 1ST HR: Performed by: SURGERY

## 2023-09-18 PROCEDURE — 80048 BASIC METABOLIC PNL TOTAL CA: CPT | Performed by: SURGERY

## 2023-09-18 PROCEDURE — 63600175 PHARM REV CODE 636 W HCPCS: Performed by: NURSE ANESTHETIST, CERTIFIED REGISTERED

## 2023-09-18 PROCEDURE — 25000003 PHARM REV CODE 250: Performed by: NURSE ANESTHETIST, CERTIFIED REGISTERED

## 2023-09-18 PROCEDURE — C1894 INTRO/SHEATH, NON-LASER: HCPCS | Performed by: SURGERY

## 2023-09-18 PROCEDURE — 27201423 OPTIME MED/SURG SUP & DEVICES STERILE SUPPLY: Performed by: SURGERY

## 2023-09-18 PROCEDURE — 37236 OPEN/PERQ PLACE STENT 1ST: CPT | Mod: RT,,, | Performed by: SURGERY

## 2023-09-18 PROCEDURE — D9220A PRA ANESTHESIA: Mod: ANES,,, | Performed by: ANESTHESIOLOGY

## 2023-09-18 PROCEDURE — 86900 BLOOD TYPING SEROLOGIC ABO: CPT | Performed by: SURGERY

## 2023-09-18 PROCEDURE — 36247 PR PLACE CATH SUBSUBSELECT ART,ABD/PEL: ICD-10-PCS | Mod: 51,RT,, | Performed by: SURGERY

## 2023-09-18 PROCEDURE — C1887 CATHETER, GUIDING: HCPCS | Performed by: SURGERY

## 2023-09-18 PROCEDURE — C1874 STENT, COATED/COV W/DEL SYS: HCPCS | Performed by: SURGERY

## 2023-09-18 PROCEDURE — 25000003 PHARM REV CODE 250: Performed by: SURGERY

## 2023-09-18 PROCEDURE — 71000033 HC RECOVERY, INTIAL HOUR: Performed by: SURGERY

## 2023-09-18 PROCEDURE — 36000707: Performed by: SURGERY

## 2023-09-18 PROCEDURE — 36247 INS CATH ABD/L-EXT ART 3RD: CPT | Mod: 51,RT,, | Performed by: SURGERY

## 2023-09-18 PROCEDURE — 36000706: Performed by: SURGERY

## 2023-09-18 PROCEDURE — 82962 GLUCOSE BLOOD TEST: CPT

## 2023-09-18 PROCEDURE — 37000008 HC ANESTHESIA 1ST 15 MINUTES: Performed by: SURGERY

## 2023-09-18 PROCEDURE — 85025 COMPLETE CBC W/AUTO DIFF WBC: CPT | Performed by: SURGERY

## 2023-09-18 PROCEDURE — D9220A PRA ANESTHESIA: ICD-10-PCS | Mod: ANES,,, | Performed by: ANESTHESIOLOGY

## 2023-09-18 PROCEDURE — 63600175 PHARM REV CODE 636 W HCPCS: Performed by: SURGERY

## 2023-09-18 DEVICE — VIABAHN SX ENDO HEPARIN 18 RO 7MMX5CM 7FR 120CM CATH
Type: IMPLANTABLE DEVICE | Site: LEG | Status: FUNCTIONAL
Brand: GORE VIABAHN ENDOPROSTHESIS WITH HEPARIN

## 2023-09-18 RX ORDER — DIPHENHYDRAMINE HYDROCHLORIDE 50 MG/ML
25 INJECTION INTRAMUSCULAR; INTRAVENOUS EVERY 6 HOURS PRN
Status: DISCONTINUED | OUTPATIENT
Start: 2023-09-18 | End: 2023-09-18 | Stop reason: HOSPADM

## 2023-09-18 RX ORDER — MORPHINE SULFATE 10 MG/ML
4 INJECTION INTRAMUSCULAR; INTRAVENOUS; SUBCUTANEOUS EVERY 5 MIN PRN
Status: DISCONTINUED | OUTPATIENT
Start: 2023-09-18 | End: 2023-09-18 | Stop reason: HOSPADM

## 2023-09-18 RX ORDER — MIDAZOLAM HYDROCHLORIDE 1 MG/ML
INJECTION INTRAMUSCULAR; INTRAVENOUS
Status: DISCONTINUED | OUTPATIENT
Start: 2023-09-18 | End: 2023-09-18

## 2023-09-18 RX ORDER — PHENYLEPHRINE HYDROCHLORIDE 10 MG/ML
INJECTION INTRAVENOUS
Status: DISCONTINUED | OUTPATIENT
Start: 2023-09-18 | End: 2023-09-18

## 2023-09-18 RX ORDER — GLIPIZIDE 5 MG/1
5 TABLET, FILM COATED, EXTENDED RELEASE ORAL
Qty: 90 TABLET | Refills: 1 | Status: SHIPPED | OUTPATIENT
Start: 2023-09-18 | End: 2023-10-18 | Stop reason: SDUPTHER

## 2023-09-18 RX ORDER — ASPIRIN 325 MG
325 TABLET ORAL DAILY
Status: DISCONTINUED | OUTPATIENT
Start: 2023-09-18 | End: 2023-09-18 | Stop reason: HOSPADM

## 2023-09-18 RX ORDER — MEPERIDINE HYDROCHLORIDE 25 MG/ML
25 INJECTION INTRAMUSCULAR; INTRAVENOUS; SUBCUTANEOUS EVERY 10 MIN PRN
Status: DISCONTINUED | OUTPATIENT
Start: 2023-09-18 | End: 2023-09-18 | Stop reason: HOSPADM

## 2023-09-18 RX ORDER — SODIUM CHLORIDE 9 MG/ML
INJECTION, SOLUTION INTRAVENOUS CONTINUOUS
Status: DISCONTINUED | OUTPATIENT
Start: 2023-09-18 | End: 2023-09-18 | Stop reason: HOSPADM

## 2023-09-18 RX ORDER — CEFAZOLIN SODIUM 1 G/3ML
INJECTION, POWDER, FOR SOLUTION INTRAMUSCULAR; INTRAVENOUS
Status: DISCONTINUED | OUTPATIENT
Start: 2023-09-18 | End: 2023-09-18

## 2023-09-18 RX ORDER — HYDROMORPHONE HYDROCHLORIDE 2 MG/ML
0.5 INJECTION, SOLUTION INTRAMUSCULAR; INTRAVENOUS; SUBCUTANEOUS EVERY 5 MIN PRN
Status: DISCONTINUED | OUTPATIENT
Start: 2023-09-18 | End: 2023-09-18 | Stop reason: HOSPADM

## 2023-09-18 RX ORDER — ONDANSETRON 2 MG/ML
4 INJECTION INTRAMUSCULAR; INTRAVENOUS DAILY PRN
Status: DISCONTINUED | OUTPATIENT
Start: 2023-09-18 | End: 2023-09-18 | Stop reason: HOSPADM

## 2023-09-18 RX ORDER — FENTANYL CITRATE 50 UG/ML
INJECTION, SOLUTION INTRAMUSCULAR; INTRAVENOUS
Status: DISCONTINUED | OUTPATIENT
Start: 2023-09-18 | End: 2023-09-18

## 2023-09-18 RX ORDER — CLOPIDOGREL BISULFATE 75 MG/1
75 TABLET ORAL DAILY
Qty: 30 TABLET | Refills: 11 | Status: SHIPPED | OUTPATIENT
Start: 2023-09-18 | End: 2024-09-17

## 2023-09-18 RX ORDER — HYDROCODONE BITARTRATE AND ACETAMINOPHEN 5; 325 MG/1; MG/1
1 TABLET ORAL EVERY 4 HOURS PRN
Status: DISCONTINUED | OUTPATIENT
Start: 2023-09-18 | End: 2023-09-18 | Stop reason: HOSPADM

## 2023-09-18 RX ORDER — PROPOFOL 10 MG/ML
VIAL (ML) INTRAVENOUS
Status: DISCONTINUED | OUTPATIENT
Start: 2023-09-18 | End: 2023-09-18

## 2023-09-18 RX ORDER — CLOPIDOGREL BISULFATE 75 MG/1
75 TABLET ORAL DAILY
Status: DISCONTINUED | OUTPATIENT
Start: 2023-09-18 | End: 2023-09-18 | Stop reason: HOSPADM

## 2023-09-18 RX ORDER — ONDANSETRON 2 MG/ML
INJECTION INTRAMUSCULAR; INTRAVENOUS
Status: DISCONTINUED | OUTPATIENT
Start: 2023-09-18 | End: 2023-09-18

## 2023-09-18 RX ORDER — DAPAGLIFLOZIN AND METFORMIN HYDROCHLORIDE 5; 1000 MG/1; MG/1
1 TABLET, FILM COATED, EXTENDED RELEASE ORAL EVERY MORNING
Qty: 90 TABLET | Refills: 3 | Status: SHIPPED | OUTPATIENT
Start: 2023-09-18 | End: 2023-10-18 | Stop reason: SDUPTHER

## 2023-09-18 RX ORDER — IPRATROPIUM BROMIDE AND ALBUTEROL SULFATE 2.5; .5 MG/3ML; MG/3ML
3 SOLUTION RESPIRATORY (INHALATION) ONCE
Status: DISCONTINUED | OUTPATIENT
Start: 2023-09-18 | End: 2023-09-18 | Stop reason: HOSPADM

## 2023-09-18 RX ORDER — LIDOCAINE HYDROCHLORIDE 20 MG/ML
INJECTION, SOLUTION EPIDURAL; INFILTRATION; INTRACAUDAL; PERINEURAL
Status: DISCONTINUED | OUTPATIENT
Start: 2023-09-18 | End: 2023-09-18

## 2023-09-18 RX ORDER — HEPARIN SODIUM 1000 [USP'U]/ML
INJECTION, SOLUTION INTRAVENOUS; SUBCUTANEOUS
Status: DISCONTINUED | OUTPATIENT
Start: 2023-09-18 | End: 2023-09-18 | Stop reason: HOSPADM

## 2023-09-18 RX ADMIN — PHENYLEPHRINE HYDROCHLORIDE 200 MCG: 10 INJECTION INTRAVENOUS at 08:09

## 2023-09-18 RX ADMIN — MIDAZOLAM HYDROCHLORIDE 2 MG: 1 INJECTION, SOLUTION INTRAMUSCULAR; INTRAVENOUS at 07:09

## 2023-09-18 RX ADMIN — FENTANYL CITRATE 100 MCG: 50 INJECTION INTRAMUSCULAR; INTRAVENOUS at 08:09

## 2023-09-18 RX ADMIN — ONDANSETRON 4 MG: 2 INJECTION INTRAMUSCULAR; INTRAVENOUS at 07:09

## 2023-09-18 RX ADMIN — PROPOFOL 130 MG: 10 INJECTION, EMULSION INTRAVENOUS at 07:09

## 2023-09-18 RX ADMIN — ASPIRIN 325 MG ORAL TABLET 325 MG: 325 PILL ORAL at 10:09

## 2023-09-18 RX ADMIN — CLOPIDOGREL BISULFATE 75 MG: 75 TABLET, FILM COATED ORAL at 10:09

## 2023-09-18 RX ADMIN — HEPARIN SODIUM 3000 UNITS: 1000 INJECTION INTRAVENOUS; SUBCUTANEOUS at 08:09

## 2023-09-18 RX ADMIN — SODIUM CHLORIDE: 9 INJECTION, SOLUTION INTRAVENOUS at 06:09

## 2023-09-18 RX ADMIN — LIDOCAINE HYDROCHLORIDE 75 MG: 20 INJECTION, SOLUTION INTRAVENOUS at 07:09

## 2023-09-18 RX ADMIN — CEFAZOLIN 2 G: 1 INJECTION, POWDER, FOR SOLUTION INTRAMUSCULAR; INTRAVENOUS; PARENTERAL at 08:09

## 2023-09-18 NOTE — OR NURSING
0909 REC'D TO PACU INSTABLE COND. PT SLEEPING, WAKES TO VOICE. CONNECTED TO BP CUFF, EKG LEADS & SPO2 MONITORS. VS STABLE. PT RESTING WELL. NO DISTRESS NOTED@ THIS TIME. WILL CONT TO MONITOR.          0940 TRANSFERRED PT BACK TO ROOM 9 INSTABLE COND. BEDSIDE REPORT GIVEN TO JOHAN GUILLAUME RN. NO DISTRESS NOTED @ THIS TIME. VS STABLE  96 %  67  98/58

## 2023-09-18 NOTE — TRANSFER OF CARE
Anesthesia Transfer of Care Note    Patient: Vishal Grace    Procedure(s) Performed: Procedure(s) (LRB):  REPAIR, ANEURYSM, ENDOVASCULAR (Right)  ANGIOGRAM, EXTREMITY, UNILATERAL (Right)    Patient location: PACU    Anesthesia Type: general    Transport from OR: Transported from OR on room air with adequate spontaneous ventilation    Post pain: adequate analgesia    Post assessment: no apparent anesthetic complications    Post vital signs: stable    Level of consciousness: sedated    Nausea/Vomiting: no nausea/vomiting    Complications: none    Transfer of care protocol was followed      Last vitals:   Visit Vitals  BP 95/62   Pulse 79   Temp 36.4 °C (97.5 °F)   Resp 14   SpO2 95%

## 2023-09-18 NOTE — OP NOTE
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  Utilize a Glidewire and RBI catheter and traversed the aortic bifurcation advanced down into the patient's SFA on the right.  Exchanged for a long sheath angiogram performed identifying above aneurysms.  We 7 Ecuadorean Terumo.  We utilize Quick Cross catheter and 018 wire assured we would traversed the aneurysms.  We took 7 x 5 VIABAHN self expanding covered stent deployed it post stent angioplasty was performed profile completion angiogram performed showing excellent result complete resolution of filling of the aneurysms patient had been heparinized ACT was followed the heparin was reversed sheath removed and pressure held per protocol    Significant Surgical Tasks Conducted by the Assistant(s), if Applicable:  Pressure held    Estimated Blood Loss (EBL): * No values recorded between 9/18/2023  8:11 AM and 9/18/2023  8:42 AM *2cc           Implants:   Implant Name Type Inv. Item Serial No.  Lot No. LRB No. Used Action   STENT VIABAHN [id number] - [id number] stent peripheral covered- self expanding STENT VIABAHN [id number] [employer]  Right 1 Implanted       Specimens:    Specimen (24h ago, onward)      None                    Condition: Good    Disposition: PACU - hemodynamically stable.    Attestation: I was present and scrubbed for the entire procedure.

## 2023-09-18 NOTE — ANESTHESIA PREPROCEDURE EVALUATION
09/18/2023  Vishal Grace is a 67 y.o., male.      Pre-op Assessment    I have reviewed the Patient Summary Reports.     I have reviewed the Nursing Notes. I have reviewed the NPO Status.   I have reviewed the Medications.     Review of Systems  Anesthesia Hx:  Denies Family Hx of Anesthesia complications.   Denies Personal Hx of Anesthesia complications.   Social:  Non-Smoker, No Alcohol Use    Hematology/Oncology:  Hematology Normal   Oncology Normal     EENT/Dental:EENT/Dental Normal   Cardiovascular:   Hypertension Denies MI.   PVD hyperlipidemia ECG has been reviewed.    Pulmonary:  Pulmonary Normal    Renal/:  Renal/ Normal     Hepatic/GI:  Hepatic/GI Normal    Musculoskeletal:  Musculoskeletal Normal    Neurological:  Neurology Normal  Denies CVA.    Endocrine:   Diabetes    Dermatological:  Skin Normal    Psych:  Psychiatric Normal           Physical Exam  General: Well nourished, Cooperative, Alert and Oriented    Airway:  Mallampati: II / II  Mouth Opening: Normal  TM Distance: Normal  Neck ROM: Normal ROM    Dental:  Intact    Chest/Lungs:  Clear to auscultation    Heart:  Rate: Normal  Rhythm: Regular Rhythm  Sounds: Normal        Chemistry        Component Value Date/Time     05/18/2023 1020    K 4.2 05/18/2023 1020     (H) 05/18/2023 1020    CO2 26 05/18/2023 1020    BUN 26 (H) 05/18/2023 1020    CREATININE 1.15 05/18/2023 1020     (H) 05/18/2023 1020        Component Value Date/Time    CALCIUM 9.3 05/18/2023 1020    ALKPHOS 79 01/05/2023 0810    AST 16 01/05/2023 0810    ALT 33 01/05/2023 0810    BILITOT 0.6 01/05/2023 0810    EGFRNONAA 65 01/11/2022 1133        Lab Results   Component Value Date    WBC 6.84 01/05/2023    HGB 17.0 01/05/2023    HCT 49.9 01/05/2023     01/05/2023     No results found for this or any previous visit.      Anesthesia Plan  Type of  Anesthesia, risks & benefits discussed:    Anesthesia Type: Gen ETT  Intra-op Monitoring Plan: Standard ASA Monitors  Post Op Pain Control Plan: multimodal analgesia  Induction:  IV  Airway Plan: Direct  Informed Consent: Informed consent signed with the Patient and all parties understand the risks and agree with anesthesia plan.  All questions answered.   ASA Score: 3  Day of Surgery Review of History & Physical: H&P Update referred to the surgeon/provider.I have interviewed and examined the patient. I have reviewed the patient's H&P dated: There are no significant changes.     Ready For Surgery From Anesthesia Perspective.     .

## 2023-09-19 NOTE — ANESTHESIA POSTPROCEDURE EVALUATION
Anesthesia Post Evaluation    Patient: Vishal Grace    Procedure(s) Performed: Procedure(s) (LRB):  REPAIR, ANEURYSM, ENDOVASCULAR (Right)  ANGIOGRAM, EXTREMITY, UNILATERAL (Right)    Final Anesthesia Type: general      Patient location during evaluation: PACU  Patient participation: Yes- Able to Participate  Level of consciousness: awake and alert  Post-procedure vital signs: reviewed and stable  Pain management: adequate  Airway patency: patent  KAREL mitigation strategies: Multimodal analgesia  PONV status at discharge: No PONV  Anesthetic complications: no      Cardiovascular status: blood pressure returned to baseline  Respiratory status: unassisted  Hydration status: euvolemic  Follow-up not needed.          Vitals Value Taken Time   /74 09/18/23 1430   Temp 36.4 °C (97.5 °F) 09/18/23 0919   Pulse 79 09/18/23 1437   Resp 14 09/18/23 0919   SpO2 96 % 09/18/23 1437   Vitals shown include unvalidated device data.      Event Time   Out of Recovery 09:40:00         Pain/Lisette Score: Lisette Score: 10 (9/18/2023  9:50 AM)  Modified Lisette Score: 20 (9/18/2023  2:46 PM)

## 2023-09-20 LAB — POC ACTIVATED CLOTTING TIME K: 259 SEC

## 2023-09-21 ENCOUNTER — OFFICE VISIT (OUTPATIENT)
Dept: FAMILY MEDICINE | Facility: CLINIC | Age: 68
End: 2023-09-21
Payer: COMMERCIAL

## 2023-09-21 VITALS
BODY MASS INDEX: 27.97 KG/M2 | RESPIRATION RATE: 20 BRPM | OXYGEN SATURATION: 97 % | HEART RATE: 91 BPM | DIASTOLIC BLOOD PRESSURE: 77 MMHG | SYSTOLIC BLOOD PRESSURE: 111 MMHG | HEIGHT: 66 IN | WEIGHT: 174 LBS | TEMPERATURE: 99 F

## 2023-09-21 DIAGNOSIS — I10 BENIGN ESSENTIAL HYPERTENSION: Chronic | ICD-10-CM

## 2023-09-21 DIAGNOSIS — E11.65 TYPE 2 DIABETES MELLITUS WITH HYPERGLYCEMIA, WITHOUT LONG-TERM CURRENT USE OF INSULIN: Primary | Chronic | ICD-10-CM

## 2023-09-21 PROBLEM — E78.00 HYPERCHOLESTEREMIA: Chronic | Status: ACTIVE | Noted: 2023-01-05

## 2023-09-21 LAB
ANION GAP SERPL CALCULATED.3IONS-SCNC: 15 MMOL/L (ref 7–16)
BUN SERPL-MCNC: 25 MG/DL (ref 7–18)
BUN/CREAT SERPL: 18 (ref 6–20)
CALCIUM SERPL-MCNC: 9.4 MG/DL (ref 8.5–10.1)
CHLORIDE SERPL-SCNC: 104 MMOL/L (ref 98–107)
CO2 SERPL-SCNC: 25 MMOL/L (ref 21–32)
CREAT SERPL-MCNC: 1.41 MG/DL (ref 0.7–1.3)
EGFR (NO RACE VARIABLE) (RUSH/TITUS): 55 ML/MIN/1.73M2
EST. AVERAGE GLUCOSE BLD GHB EST-MCNC: 124 MG/DL
GLUCOSE SERPL-MCNC: 97 MG/DL (ref 74–106)
HBA1C MFR BLD HPLC: 6.3 % (ref 4.5–6.6)
POTASSIUM SERPL-SCNC: 4.2 MMOL/L (ref 3.5–5.1)
SODIUM SERPL-SCNC: 140 MMOL/L (ref 136–145)

## 2023-09-21 PROCEDURE — 83036 HEMOGLOBIN GLYCOSYLATED A1C: CPT | Mod: ,,, | Performed by: CLINICAL MEDICAL LABORATORY

## 2023-09-21 PROCEDURE — 80048 BASIC METABOLIC PANEL: ICD-10-PCS | Mod: ,,, | Performed by: CLINICAL MEDICAL LABORATORY

## 2023-09-21 PROCEDURE — 83036 HEMOGLOBIN A1C: ICD-10-PCS | Mod: ,,, | Performed by: CLINICAL MEDICAL LABORATORY

## 2023-09-21 PROCEDURE — 80048 BASIC METABOLIC PNL TOTAL CA: CPT | Mod: ,,, | Performed by: CLINICAL MEDICAL LABORATORY

## 2023-09-21 PROCEDURE — 99213 OFFICE O/P EST LOW 20 MIN: CPT | Mod: ,,, | Performed by: NURSE PRACTITIONER

## 2023-09-21 PROCEDURE — 99213 PR OFFICE/OUTPT VISIT, EST, LEVL III, 20-29 MIN: ICD-10-PCS | Mod: ,,, | Performed by: NURSE PRACTITIONER

## 2023-09-21 NOTE — PROGRESS NOTES
[facility] - FAMILY MEDICINE       PATIENT NAME: Vishal Grace   : 1955    AGE: 67 y.o. DATE OF ENCOUNTER: 23    MRN: [id number]      PCP: Elaine Young FNP    Reason for Visit / Chief Complaint:  Follow-up and Diabetes (Patient presents to clinic for 4m f/u /Eye exam pt schedules, Dr mason?)         274}    Subjective:     HPI:    Presents for 4-mth f/u uncontrolled T2DM and HTN.  Flomax was started at previous visit for slow stream and nocturia which has helped.  Yesterday glucose 115; reports running around 97-130s.    Had right popliteal stent per Dr. Dodge Monday; doing well.    Review of Systems:   Review of Systems   Constitutional: Negative.    HENT: Negative.     Respiratory: Negative.     Cardiovascular: Negative.    Gastrointestinal: Negative.    Skin: Negative.    Neurological: Negative.    Psychiatric/Behavioral: Negative.         Allergies and Meds: 274}     Review of patient's allergies indicates:   Allergen Reactions    Pineapple Hives        Current Outpatient Medications:     clopidogreL (PLAVIX) 75 mg tablet, Take 1 tablet (75 mg total) by mouth once daily., Disp: 30 tablet, Rfl: 11    dapaglifloz propaned-metformin (XIGDUO XR) 5-1,000 mg, Take 1 tablet by mouth every morning. (Patient taking differently: Take 1 tablet by mouth every evening.), Disp: 90 tablet, Rfl: 3    glipiZIDE 5 MG TR24, Take 1 tablet (5 mg total) by mouth daily with breakfast., Disp: 90 tablet, Rfl: 1    lisinopriL (PRINIVIL,ZESTRIL) 20 MG tablet, Take 1 tablet (20 mg total) by mouth once daily., Disp: 90 tablet, Rfl: 1    meloxicam (MOBIC) 15 MG tablet, Take 1 tablet (15 mg total) by mouth once daily., Disp: 30 tablet, Rfl: 0    pravastatin (PRAVACHOL) 10 MG tablet, Take 1 tablet (10 mg total) by mouth once daily., Disp: 90 tablet, Rfl: 3    tamsulosin (FLOMAX) 0.4 mg Cap, Take 1 capsule (0.4 mg total) by mouth once daily., Disp: 90 capsule, Rfl: 3    Labs:274}   I have reviewed labs  below:  Lab Results   Component Value Date    WBC 6.78 09/18/2023    RBC 5.30 09/18/2023    HGB 16.7 09/18/2023    HCT 48.5 09/18/2023     09/18/2023     09/18/2023    K 4.4 09/18/2023     (H) 09/18/2023    CALCIUM 9.0 09/18/2023     (H) 09/18/2023    BUN 25 (H) 09/18/2023    CREATININE 1.29 09/18/2023    EGFRNONAA 65 01/11/2022    ALT 33 01/05/2023    AST 16 01/05/2023    CHOL 157 01/05/2023    TRIG 128 01/05/2023    HDL 41 01/05/2023    LDLCALC 90 01/05/2023    TSH 3.210 01/05/2023    PSA 3.310 05/18/2023    HGBA1C 6.0 05/18/2023    MICROALBUR 0.5 01/05/2023     Medical History: 274}     Past Medical History:   Diagnosis Date    COVID-19 08/20/2021    Deficiency of testosterone biosynthesis     Diabetes mellitus, type 2     Hypertension     Long term (current) use of oral hypoglycemic drugs     Other long term (current) drug therapy     Personal history of COVID-19 8/20/2021    Personal history of malignant melanoma of skin 1998    Personal history of malignant neoplasm of other parts of nervous tissue 04/2015    Referred to Dr. 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; had re-excision of the area 6/4/15 without residual tumor noted; had 30 radiation treatments between 8/4/15-9/15/2015      Social History     Tobacco Use   Smoking Status Never    Passive exposure: Never   Smokeless Tobacco Never      Past Surgical History:   Procedure Laterality Date    ANGIOGRAM, EXTREMITY, UNILATERAL Right 9/18/2023    Procedure: ANGIOGRAM, EXTREMITY, UNILATERAL;  Surgeon: Tonja Dodge MD;  Location: [facility] OR;  Service: Vascular;  Laterality: Right;    ENDOVASCULAR REPAIR OF ANEURYSM Right 9/18/2023    Procedure: REPAIR, ANEURYSM, ENDOVASCULAR;  Surgeon: Tonja Dodge MD;  Location: [facility] OR;  Service: Vascular;  Laterality: Right;  popliteal artery    EXCISION OF MELANOMA  1998     "TUMOR EXCISION          Health Maintenance: 274}     Health Maintenance         Date Due Completion Date    Eye Exam 10/18/2022 10/18/2021 (Done)    Override on 10/18/2021: Done ([facility])    Override on 6/10/2019: Done ([facility]- Dr. Goodrich.)    Hemoglobin A1c 08/18/2023 5/18/2023    Diabetes Urine Screening 01/05/2024 1/5/2023    Foot Exam 01/05/2024 1/5/2023    Lipid Panel 01/05/2024 1/5/2023    PROSTATE-SPECIFIC ANTIGEN 05/18/2024 5/18/2023    Override on 5/18/2020: Done    TETANUS VACCINE 05/25/2031 5/25/2021    Colorectal Cancer Screening 04/21/2033 4/21/2023    Override on 2/4/2013: Done ([facility]- Dr. Singh. Repeat in 5 years.)            Objective:  274}   /77 (BP Location: Right arm, Patient Position: Sitting, BP Method: Medium (Automatic))   Pulse 91   Temp 98.5 °F (36.9 °C) (Oral)   Resp 20   Ht 5' 6" (1.676 m)   Wt 78.9 kg (174 lb)   SpO2 97%   BMI 28.08 kg/m²     Wt Readings from Last 3 Encounters:   09/21/23 78.9 kg (174 lb)   08/24/23 79.4 kg (175 lb)   07/27/23 79.4 kg (175 lb)     BP Readings from Last 3 Encounters:   09/21/23 111/77   09/18/23 115/74   06/08/23 119/80     Body mass index is 28.08 kg/m².     Physical Exam  Vitals and nursing note reviewed.   Constitutional:       General: He is not in acute distress.     Appearance: Normal appearance. He is not ill-appearing.   HENT:      Head: Normocephalic.   Eyes:      Conjunctiva/sclera: Conjunctivae normal.   Cardiovascular:      Rate and Rhythm: Normal rate and regular rhythm.      Heart sounds: Normal heart sounds.   Pulmonary:      Effort: Pulmonary effort is normal. No respiratory distress.      Breath sounds: Normal breath sounds.   Musculoskeletal:      Cervical back: Neck supple.      Right lower leg: No edema.      Left lower leg: No edema.   Skin:     General: Skin is warm and dry.   Neurological:      Mental Status: He is alert and oriented to person, place, and time. "          Assessment and Plan: 274}     1. Type 2 diabetes mellitus with hyperglycemia, without long-term current use of insulin  -     Basic Metabolic Panel; Future; Expected date: 09/21/2023  -     Hemoglobin A1C; Future; Expected date: 09/21/2023    2. Benign essential hypertension       Wants to self-schedule eye exam as requested on previous visits. Strongly urged to schedule diabetic eye exam.    Return to clinic 4 mth f/u T2DM, HTN, fasting labs; and sooner as needed.    Future Appointments   Date Time Provider Department Center   10/5/2023  1:15 PM Tonja Dodge MD [facility]        Signature:  ANABELLA Cruz

## 2023-10-03 NOTE — DISCHARGE SUMMARY
[facility] - Periop Services  Discharge Note  Short Stay    Procedure(s) (LRB):  REPAIR, ANEURYSM, ENDOVASCULAR (Right)  ANGIOGRAM, EXTREMITY, UNILATERAL (Right)      OUTCOME: Patient tolerated treatment/procedure well without complication and is now ready for discharge.    DISPOSITION: Home or Self Care    FINAL DIAGNOSIS:  <principal problem not specified>popliteal artery aneurysm    FOLLOWUP: In clinic    DISCHARGE INSTRUCTIONS:    Discharge Procedure Orders   Diet general     Other restrictions (specify):   Order Comments: No work or driving for 72 hours     Call MD for:  severe uncontrolled pain     Remove dressing in 24 hours         Clinical Reference Documents Added to Patient Instructions         Document    [facility] RETURN TO WORK            TIME SPENT ON DISCHARGE: 15 minutes

## 2023-10-05 ENCOUNTER — OFFICE VISIT (OUTPATIENT)
Dept: VASCULAR SURGERY | Facility: CLINIC | Age: 68
End: 2023-10-05
Payer: COMMERCIAL

## 2023-10-05 VITALS — OXYGEN SATURATION: 100 % | HEART RATE: 99 BPM

## 2023-10-05 DIAGNOSIS — I72.4 POPLITEAL ANEURYSM: Primary | ICD-10-CM

## 2023-10-05 PROCEDURE — 99213 PR OFFICE/OUTPT VISIT, EST, LEVL III, 20-29 MIN: ICD-10-PCS | Mod: S$PBB,,, | Performed by: SURGERY

## 2023-10-05 PROCEDURE — 99213 OFFICE O/P EST LOW 20 MIN: CPT | Mod: S$PBB,,, | Performed by: SURGERY

## 2023-10-05 PROCEDURE — 99213 OFFICE O/P EST LOW 20 MIN: CPT | Mod: PBBFAC | Performed by: SURGERY

## 2023-10-05 NOTE — PROGRESS NOTES
Subjective:       Patient ID: Vishal Grace is a 67 y.o. male.    Chief Complaint: Follow-up (Stent in right leg )  Status patient follow-up.  He had a right popliteal artery aneurysms treated percutaneously with a VIABAHN covered stent he is no complaints of right groins without abnormalities  family history includes Heart disease in his father; No Known Problems in his son; Stroke in his mother.  Past Medical History:   Diagnosis Date    COVID-19 08/20/2021    Deficiency of testosterone biosynthesis     Diabetes mellitus, type 2     Hypertension     Long term (current) use of oral hypoglycemic drugs     Other long term (current) drug therapy     Personal history of COVID-19 8/20/2021    Personal history of malignant melanoma of skin 1998    Personal history of malignant neoplasm of other parts of nervous tissue 04/2015    Referred to Dr. 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 OR;  Service: Vascular;  Laterality: Right;    ENDOVASCULAR REPAIR OF ANEURYSM Right 9/18/2023    Procedure: REPAIR, ANEURYSM, ENDOVASCULAR;  Surgeon: Tonja Dodge MD;  Location: [facility];  Service: Vascular;  Laterality: Right;  popliteal artery    EXCISION OF MELANOMA  1998    TUMOR EXCISION         reports that he has never smoked. He has never been exposed to tobacco smoke. He has never used smokeless tobacco. He reports that he does not currently use alcohol. He reports that he does not use drugs.   HPI  Review of Systems      Objective:      Pulse 99   SpO2 100%    Physical Exam  Constitutional:        Appearance: Normal appearance.   Cardiovascular:      Rate and Rhythm: Normal rate.   Pulmonary:      Effort: Pulmonary effort is normal.   Skin:     Capillary Refill: Capillary refill takes less than 2 seconds.   Neurological:      General: No focal deficit present.      Mental Status: He is alert.   Psychiatric:         Mood and Affect: Mood normal.         Behavior: Behavior normal.         Thought Content: Thought content normal.         Judgment: Judgment normal.           Assessment:       1. Popliteal aneurysm        Plan:       Duplex 1 year, continue Plavix lifelong medication

## 2023-10-18 DIAGNOSIS — E11.65 TYPE 2 DIABETES MELLITUS WITH HYPERGLYCEMIA, WITHOUT LONG-TERM CURRENT USE OF INSULIN: Chronic | ICD-10-CM

## 2023-10-18 DIAGNOSIS — E11.65 TYPE 2 DIABETES MELLITUS WITH HYPERGLYCEMIA, WITHOUT LONG-TERM CURRENT USE OF INSULIN: ICD-10-CM

## 2023-10-18 RX ORDER — GLIPIZIDE 5 MG/1
5 TABLET, FILM COATED, EXTENDED RELEASE ORAL
Qty: 90 TABLET | Refills: 1 | Status: SHIPPED | OUTPATIENT
Start: 2023-10-18 | End: 2024-02-07

## 2023-10-18 RX ORDER — DAPAGLIFLOZIN AND METFORMIN HYDROCHLORIDE 5; 1000 MG/1; MG/1
1 TABLET, FILM COATED, EXTENDED RELEASE ORAL EVERY MORNING
Qty: 90 TABLET | Refills: 3 | Status: SHIPPED | OUTPATIENT
Start: 2023-10-18 | End: 2024-01-09 | Stop reason: SDUPTHER

## 2023-10-18 NOTE — TELEPHONE ENCOUNTER
----- Message from Christy Sykes sent at 10/18/2023  9:08 AM CDT -----  Pt need refill on XIGDUO XR and glipiZIDE 5 MG sent in to [facility]

## 2023-11-02 DIAGNOSIS — I10 BENIGN ESSENTIAL HYPERTENSION: Chronic | ICD-10-CM

## 2023-11-02 RX ORDER — LISINOPRIL 20 MG/1
20 TABLET ORAL DAILY
Qty: 90 TABLET | Refills: 1 | Status: SHIPPED | OUTPATIENT
Start: 2023-11-02

## 2023-12-13 LAB
LEFT EYE DM RETINOPATHY: NEGATIVE
RIGHT EYE DM RETINOPATHY: NEGATIVE

## 2023-12-18 ENCOUNTER — PATIENT OUTREACH (OUTPATIENT)
Dept: ADMINISTRATIVE | Facility: HOSPITAL | Age: 68
End: 2023-12-18

## 2023-12-18 NOTE — PROGRESS NOTES
Population Health Chart Review & Patient Outreach Details    Updates Requested / Reviewed:  [x]  Care Team Updated    Health Maintenance Topics Addressed and Outreach Outcomes / Actions Taken:          Diabetic Eye Exam [x] HM Updated with December 2023 Eye Exam (Dr. An). History Updated.

## 2024-01-09 ENCOUNTER — OFFICE VISIT (OUTPATIENT)
Dept: FAMILY MEDICINE | Facility: CLINIC | Age: 69
End: 2024-01-09
Payer: COMMERCIAL

## 2024-01-09 VITALS
OXYGEN SATURATION: 96 % | DIASTOLIC BLOOD PRESSURE: 69 MMHG | BODY MASS INDEX: 28.61 KG/M2 | WEIGHT: 178 LBS | HEART RATE: 81 BPM | TEMPERATURE: 98 F | SYSTOLIC BLOOD PRESSURE: 94 MMHG | RESPIRATION RATE: 20 BRPM | HEIGHT: 66 IN

## 2024-01-09 DIAGNOSIS — I73.9 PVD (PERIPHERAL VASCULAR DISEASE): Chronic | ICD-10-CM

## 2024-01-09 DIAGNOSIS — Z79.899 ENCOUNTER FOR LONG-TERM (CURRENT) USE OF OTHER MEDICATIONS: ICD-10-CM

## 2024-01-09 DIAGNOSIS — E78.00 HYPERCHOLESTEREMIA: Chronic | ICD-10-CM

## 2024-01-09 DIAGNOSIS — E11.65 TYPE 2 DIABETES MELLITUS WITH HYPERGLYCEMIA, WITHOUT LONG-TERM CURRENT USE OF INSULIN: Primary | Chronic | ICD-10-CM

## 2024-01-09 DIAGNOSIS — I10 BENIGN ESSENTIAL HYPERTENSION: Chronic | ICD-10-CM

## 2024-01-09 LAB
ALBUMIN SERPL BCP-MCNC: 4.3 G/DL (ref 3.5–5)
ALBUMIN/GLOB SERPL: 1.3 {RATIO}
ALP SERPL-CCNC: 77 U/L (ref 45–115)
ALT SERPL W P-5'-P-CCNC: 65 U/L (ref 16–61)
ANION GAP SERPL CALCULATED.3IONS-SCNC: 13 MMOL/L (ref 7–16)
AST SERPL W P-5'-P-CCNC: 39 U/L (ref 15–37)
BASOPHILS # BLD AUTO: 0.03 K/UL (ref 0–0.2)
BASOPHILS NFR BLD AUTO: 0.4 % (ref 0–1)
BILIRUB SERPL-MCNC: 0.8 MG/DL (ref ?–1.2)
BUN SERPL-MCNC: 20 MG/DL (ref 7–18)
BUN/CREAT SERPL: 14 (ref 6–20)
CALCIUM SERPL-MCNC: 9.2 MG/DL (ref 8.5–10.1)
CHLORIDE SERPL-SCNC: 106 MMOL/L (ref 98–107)
CHOLEST SERPL-MCNC: 140 MG/DL (ref 0–200)
CHOLEST/HDLC SERPL: 3.2 {RATIO}
CO2 SERPL-SCNC: 26 MMOL/L (ref 21–32)
CREAT SERPL-MCNC: 1.38 MG/DL (ref 0.7–1.3)
CREAT UR-MCNC: 138 MG/DL (ref 39–259)
DIFFERENTIAL METHOD BLD: ABNORMAL
EGFR (NO RACE VARIABLE) (RUSH/TITUS): 56 ML/MIN/1.73M2
EOSINOPHIL # BLD AUTO: 0.27 K/UL (ref 0–0.5)
EOSINOPHIL NFR BLD AUTO: 4 % (ref 1–4)
ERYTHROCYTE [DISTWIDTH] IN BLOOD BY AUTOMATED COUNT: 13.2 % (ref 11.5–14.5)
EST. AVERAGE GLUCOSE BLD GHB EST-MCNC: 151 MG/DL
GLOBULIN SER-MCNC: 3.2 G/DL (ref 2–4)
GLUCOSE SERPL-MCNC: 122 MG/DL (ref 74–106)
HBA1C MFR BLD HPLC: 6.9 % (ref 4.5–6.6)
HCT VFR BLD AUTO: 51.1 % (ref 40–54)
HDLC SERPL-MCNC: 44 MG/DL (ref 40–60)
HGB BLD-MCNC: 17.7 G/DL (ref 13.5–18)
IMM GRANULOCYTES # BLD AUTO: 0.01 K/UL (ref 0–0.04)
IMM GRANULOCYTES NFR BLD: 0.1 % (ref 0–0.4)
LDLC SERPL CALC-MCNC: 78 MG/DL
LDLC/HDLC SERPL: 1.8 {RATIO}
LYMPHOCYTES # BLD AUTO: 1.34 K/UL (ref 1–4.8)
LYMPHOCYTES NFR BLD AUTO: 20 % (ref 27–41)
MCH RBC QN AUTO: 31.4 PG (ref 27–31)
MCHC RBC AUTO-ENTMCNC: 34.6 G/DL (ref 32–36)
MCV RBC AUTO: 90.8 FL (ref 80–96)
MICROALBUMIN UR-MCNC: 0.5 MG/DL (ref 0–2.8)
MICROALBUMIN/CREAT RATIO PNL UR: 3.6 MG/G (ref 0–30)
MONOCYTES # BLD AUTO: 0.6 K/UL (ref 0–0.8)
MONOCYTES NFR BLD AUTO: 9 % (ref 2–6)
MPC BLD CALC-MCNC: 9.8 FL (ref 9.4–12.4)
NEUTROPHILS # BLD AUTO: 4.45 K/UL (ref 1.8–7.7)
NEUTROPHILS NFR BLD AUTO: 66.5 % (ref 53–65)
NONHDLC SERPL-MCNC: 96 MG/DL
NRBC # BLD AUTO: 0 X10E3/UL
NRBC, AUTO (.00): 0 %
PLATELET # BLD AUTO: 185 K/UL (ref 150–400)
POTASSIUM SERPL-SCNC: 4.5 MMOL/L (ref 3.5–5.1)
PROT SERPL-MCNC: 7.5 G/DL (ref 6.4–8.2)
RBC # BLD AUTO: 5.63 M/UL (ref 4.6–6.2)
SODIUM SERPL-SCNC: 140 MMOL/L (ref 136–145)
TRIGL SERPL-MCNC: 89 MG/DL (ref 35–150)
TSH SERPL DL<=0.005 MIU/L-ACNC: 3.68 UIU/ML (ref 0.36–3.74)
VLDLC SERPL-MCNC: 18 MG/DL
WBC # BLD AUTO: 6.7 K/UL (ref 4.5–11)

## 2024-01-09 PROCEDURE — 80050 GENERAL HEALTH PANEL: CPT | Mod: ,,, | Performed by: CLINICAL MEDICAL LABORATORY

## 2024-01-09 PROCEDURE — 99214 OFFICE O/P EST MOD 30 MIN: CPT | Mod: ,,, | Performed by: NURSE PRACTITIONER

## 2024-01-09 PROCEDURE — 80061 LIPID PANEL: CPT | Mod: ,,, | Performed by: CLINICAL MEDICAL LABORATORY

## 2024-01-09 PROCEDURE — 83036 HEMOGLOBIN GLYCOSYLATED A1C: CPT | Mod: ,,, | Performed by: CLINICAL MEDICAL LABORATORY

## 2024-01-09 PROCEDURE — 82043 UR ALBUMIN QUANTITATIVE: CPT | Mod: ,,, | Performed by: CLINICAL MEDICAL LABORATORY

## 2024-01-09 PROCEDURE — 82570 ASSAY OF URINE CREATININE: CPT | Mod: ,,, | Performed by: CLINICAL MEDICAL LABORATORY

## 2024-01-09 RX ORDER — PRAVASTATIN SODIUM 40 MG/1
40 TABLET ORAL DAILY
Qty: 90 TABLET | Refills: 3 | Status: SHIPPED | OUTPATIENT
Start: 2024-01-09 | End: 2025-01-08

## 2024-01-09 RX ORDER — DAPAGLIFLOZIN AND METFORMIN HYDROCHLORIDE 5; 1000 MG/1; MG/1
1 TABLET, FILM COATED, EXTENDED RELEASE ORAL EVERY MORNING
Qty: 90 TABLET | Refills: 3 | Status: SHIPPED | OUTPATIENT
Start: 2024-01-09

## 2024-01-09 NOTE — PROGRESS NOTES
[facility] - FAMILY MEDICINE       PATIENT NAME: Vishal Grace   : 1955    AGE: 68 y.o. DATE OF ENCOUNTER: 24    MRN: [id number]      PCP: Elaine Young FNP    Reason for Visit / Chief Complaint:  Hypertension and Diabetes (Patient presents to the clinic for 4m f/u)         274}    Subjective:     HPI:    Presents for 4 mth f/u T2DM & HTN.    Rare monitoring of glucose, last A1c was improved.  Reports regained some weight over the holidays.    History of right popliteal stent per Dr. Dodge.    Review of Systems:   Review of Systems   Constitutional: Negative.    HENT: Negative.     Respiratory: Negative.     Cardiovascular: Negative.    Gastrointestinal: Negative.    Skin: Negative.    Neurological: Negative.    Psychiatric/Behavioral: Negative.         Allergies and Meds: 274}     Review of patient's allergies indicates:   Allergen Reactions    Pineapple Hives        Current Outpatient Medications:     clopidogreL (PLAVIX) 75 mg tablet, Take 1 tablet (75 mg total) by mouth once daily., Disp: 30 tablet, Rfl: 11    glipiZIDE 5 MG TR24, Take 1 tablet (5 mg total) by mouth daily with breakfast., Disp: 90 tablet, Rfl: 1    lisinopriL (PRINIVIL,ZESTRIL) 20 MG tablet, Take 1 tablet (20 mg total) by mouth once daily., Disp: 90 tablet, Rfl: 1    meloxicam (MOBIC) 15 MG tablet, Take 1 tablet (15 mg total) by mouth once daily., Disp: 30 tablet, Rfl: 0    tamsulosin (FLOMAX) 0.4 mg Cap, Take 1 capsule (0.4 mg total) by mouth once daily., Disp: 90 capsule, Rfl: 3    dapaglifloz propaned-metformin (XIGDUO XR) 5-1,000 mg, Take 1 tablet by mouth every morning., Disp: 90 tablet, Rfl: 3    pravastatin (PRAVACHOL) 40 MG tablet, Take 1 tablet (40 mg total) by mouth once daily., Disp: 90 tablet, Rfl: 3    Labs:274}   I have reviewed labs below:  Lab Results   Component Value Date    WBC 6.78 2023    RBC 5.30 2023    HGB 16.7 2023    HCT 48.5 2023     2023    NA  140 09/21/2023    K 4.2 09/21/2023     09/21/2023    CALCIUM 9.4 09/21/2023    GLU 97 09/21/2023    BUN 25 (H) 09/21/2023    CREATININE 1.41 (H) 09/21/2023    EGFRNONAA 65 01/11/2022    ALT 33 01/05/2023    AST 16 01/05/2023    CHOL 157 01/05/2023    TRIG 128 01/05/2023    HDL 41 01/05/2023    LDLCALC 90 01/05/2023    TSH 3.210 01/05/2023    PSA 3.310 05/18/2023    HGBA1C 6.3 09/21/2023    MICROALBUR 0.5 01/05/2023       Medical History: 274}     Past Medical History:   Diagnosis Date    COVID-19 08/20/2021    Deficiency of testosterone biosynthesis     Diabetes mellitus, type 2     Diabetic eye exam 12/13/2023    Dr. Victor Hugo An, OD - [facility]    Hypertension     Long term (current) use of oral hypoglycemic drugs     Other long term (current) drug therapy     Personal history of COVID-19 08/20/2021    Personal history of malignant melanoma of skin 1998    Personal history of malignant neoplasm of other parts of nervous tissue 04/2015    Referred to Dr. 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; had re-excision of the area 6/4/15 without residual tumor noted; had 30 radiation treatments between 8/4/15-9/15/2015      Social History     Tobacco Use   Smoking Status Never    Passive exposure: Never   Smokeless Tobacco Never      Past Surgical History:   Procedure Laterality Date    ANGIOGRAM, EXTREMITY, UNILATERAL Right 9/18/2023    Procedure: ANGIOGRAM, EXTREMITY, UNILATERAL;  Surgeon: Tonja Dodge MD;  Location: [facility] OR;  Service: Vascular;  Laterality: Right;    ENDOVASCULAR REPAIR OF ANEURYSM Right 9/18/2023    Procedure: REPAIR, ANEURYSM, ENDOVASCULAR;  Surgeon: Tonja Dodge MD;  Location: [facility] OR;  Service: Vascular;  Laterality: Right;  popliteal artery    EXCISION OF MELANOMA  1998    TUMOR EXCISION          Health Maintenance: 274}     Health Maintenance          "Date Due Completion Date    RSV Vaccine (Age 60+ and Pregnant patients) (1 - 1-dose 60+ series) Never done ---    Hemoglobin A1c 04/09/2024 1/9/2024    PROSTATE-SPECIFIC ANTIGEN 05/18/2024 5/18/2023    Override on 5/18/2020: Done    Eye Exam 12/13/2024 12/13/2023    High Dose Statin 01/09/2025 1/9/2024    Diabetes Urine Screening 01/09/2025 1/9/2024    Foot Exam 01/09/2025 1/9/2024    Lipid Panel 01/09/2025 1/9/2024    Colorectal Cancer Screening 04/21/2030 4/21/2023    TETANUS VACCINE 05/25/2031 5/25/2021          Immunization History   Administered Date(s) Administered    Pneumococcal Conjugate - 20 Valent 01/19/2023    Pneumococcal Polysaccharide - 23 Valent 01/16/2017, 01/11/2022    Tdap 05/25/2021    Zoster Recombinant 01/05/2023, 05/18/2023     Objective:  274}   BP 94/69 (BP Location: Right arm, Patient Position: Sitting, BP Method: Large (Automatic))   Pulse 81   Temp 98.1 °F (36.7 °C) (Oral)   Resp 20   Ht 5' 6" (1.676 m)   Wt 80.7 kg (178 lb)   SpO2 96%   BMI 28.73 kg/m²     Wt Readings from Last 3 Encounters:   01/09/24 80.7 kg (178 lb)   09/21/23 78.9 kg (174 lb)   08/24/23 79.4 kg (175 lb)     BP Readings from Last 3 Encounters:   01/09/24 94/69   09/21/23 111/77   09/18/23 115/74     Body mass index is 28.73 kg/m².     Physical Exam  Vitals and nursing note reviewed.   Constitutional:       General: He is not in acute distress.     Appearance: Normal appearance.   HENT:      Head: Normocephalic.      Right Ear: Tympanic membrane, ear canal and external ear normal.      Left Ear: Tympanic membrane, ear canal and external ear normal.      Nose: Nose normal.      Mouth/Throat:      Mouth: Mucous membranes are moist.      Pharynx: Oropharynx is clear.   Eyes:      Conjunctiva/sclera: Conjunctivae normal.      Pupils: Pupils are equal, round, and reactive to light.   Neck:      Thyroid: No thyromegaly.      Vascular: Normal carotid pulses. No carotid bruit.      Trachea: Trachea normal. "   Cardiovascular:      Rate and Rhythm: Normal rate and regular rhythm.      Pulses:           Dorsalis pedis pulses are 3+ on the right side and 3+ on the left side.        Posterior tibial pulses are 3+ on the right side and 3+ on the left side.      Heart sounds: Normal heart sounds.   Pulmonary:      Effort: Pulmonary effort is normal. No respiratory distress.      Breath sounds: Normal breath sounds.   Musculoskeletal:      Cervical back: Neck supple.      Right lower leg: No edema.      Left lower leg: No edema.      Right foot: Normal range of motion. No deformity or bunion.      Left foot: Normal range of motion. No deformity or bunion.   Feet:      Right foot:      Protective Sensation: 10 sites tested.  10 sites sensed.      Skin integrity: Dry skin present. No ulcer, blister, erythema, warmth, callus or fissure.      Toenail Condition: Right toenails are abnormally thick. Fungal disease present.     Left foot:      Protective Sensation: 10 sites tested.  10 sites sensed.      Skin integrity: Dry skin present. No ulcer, blister, erythema, warmth, callus or fissure.      Toenail Condition: Left toenails are abnormally thick.   Lymphadenopathy:      Cervical: No cervical adenopathy.      Upper Body:      Right upper body: No supraclavicular adenopathy.      Left upper body: No supraclavicular adenopathy.   Skin:     General: Skin is warm and dry.      Findings: No rash.   Neurological:      General: No focal deficit present.      Mental Status: He is alert and oriented to person, place, and time.   Psychiatric:         Mood and Affect: Mood normal.         Behavior: Behavior normal.          Assessment and Plan: 274}     1. Type 2 diabetes mellitus with hyperglycemia, without long-term current use of insulin  Comments:  Last A1c improved to 6.3% September 2023.  Recheck A1c today.    Continue current meds.  Encouraged to get back on track with diet.  Orders:  -     Comprehensive Metabolic Panel; Future;  Expected date: 01/09/2024  -     Microalbumin/Creatinine Ratio, Urine; Future; Expected date: 01/09/2024  -     Hemoglobin A1C; Future; Expected date: 01/09/2024  -     dapaglifloz propaned-metformin (XIGDUO XR) 5-1,000 mg; Take 1 tablet by mouth every morning.  Dispense: 90 tablet; Refill: 3    2. Benign essential hypertension  Comments:  Very well controlled, continue current treatment.    3. Encounter for long-term (current) use of other medications  -     CBC Auto Differential; Future; Expected date: 01/09/2024  -     Comprehensive Metabolic Panel; Future; Expected date: 01/09/2024  -     TSH; Future; Expected date: 01/09/2024    4. Hypercholesteremia  Comments:  Controlled but increase pravastatin dosage from 10 to 40 mg due to needs high-dose statin.  Orders:  -     Comprehensive Metabolic Panel; Future; Expected date: 01/09/2024  -     Lipid Panel; Future; Expected date: 01/09/2024  -     pravastatin (PRAVACHOL) 40 MG tablet; Take 1 tablet (40 mg total) by mouth once daily.  Dispense: 90 tablet; Refill: 3    5. PVD (peripheral vascular disease)  -     pravastatin (PRAVACHOL) 40 MG tablet; Take 1 tablet (40 mg total) by mouth once daily.  Dispense: 90 tablet; Refill: 3       Return to clinic 4 mths f/u T2DM, non-fasting; and sooner as needed.    Future Appointments   Date Time Provider Department Center   5/14/2024 10:40 AM Elaine Young FNP [facility]   10/2/2024  1:30 PM [facility]   10/2/2024  2:15 PM Tonja Dodge MD [facility]        Signature:  ANABELLA Cruz

## 2024-01-15 NOTE — PROGRESS NOTES
Call pt and review results. A1c has increased with his holiday eating.  Stable CKD 3a.  Need to drink more water.

## 2024-01-18 DIAGNOSIS — M79.662 PAIN OF LEFT CALF: ICD-10-CM

## 2024-01-18 RX ORDER — MELOXICAM 15 MG/1
15 TABLET ORAL
Qty: 30 TABLET | Refills: 0 | OUTPATIENT
Start: 2024-01-18

## 2024-01-18 NOTE — TELEPHONE ENCOUNTER
----- Message from [place] sent at 1/18/2024  8:13 AM CST -----  XIGDUO TO [facility]

## 2024-02-07 DIAGNOSIS — E11.65 TYPE 2 DIABETES MELLITUS WITH HYPERGLYCEMIA, WITHOUT LONG-TERM CURRENT USE OF INSULIN: ICD-10-CM

## 2024-02-07 RX ORDER — GLIPIZIDE 5 MG/1
5 TABLET, FILM COATED, EXTENDED RELEASE ORAL
Qty: 90 TABLET | Refills: 1 | Status: SHIPPED | OUTPATIENT
Start: 2024-02-07

## 2024-04-26 DIAGNOSIS — I10 BENIGN ESSENTIAL HYPERTENSION: Chronic | ICD-10-CM

## 2024-04-26 DIAGNOSIS — R97.20 PSA ELEVATION: ICD-10-CM

## 2024-04-27 RX ORDER — LISINOPRIL 20 MG/1
20 TABLET ORAL DAILY
Qty: 90 TABLET | Refills: 3 | Status: SHIPPED | OUTPATIENT
Start: 2024-04-27

## 2024-04-27 RX ORDER — TAMSULOSIN HYDROCHLORIDE 0.4 MG/1
1 CAPSULE ORAL DAILY
Qty: 90 CAPSULE | Refills: 3 | Status: SHIPPED | OUTPATIENT
Start: 2024-04-27

## 2024-05-11 ENCOUNTER — HOSPITAL ENCOUNTER (EMERGENCY)
Facility: HOSPITAL | Age: 69
Discharge: HOME | End: 2024-05-11
Payer: COMMERCIAL

## 2024-05-11 VITALS
WEIGHT: 177 LBS | SYSTOLIC BLOOD PRESSURE: 121 MMHG | HEIGHT: 66 IN | HEART RATE: 82 BPM | DIASTOLIC BLOOD PRESSURE: 85 MMHG | BODY MASS INDEX: 28.45 KG/M2 | RESPIRATION RATE: 16 BRPM | TEMPERATURE: 98 F | OXYGEN SATURATION: 95 %

## 2024-05-11 DIAGNOSIS — S61.211A LACERATION OF LEFT INDEX FINGER, FOREIGN BODY PRESENCE UNSPECIFIED, NAIL DAMAGE STATUS UNSPECIFIED, INITIAL ENCOUNTER: Primary | ICD-10-CM

## 2024-05-11 PROCEDURE — 12001 RPR S/N/AX/GEN/TRNK 2.5CM/<: CPT

## 2024-05-11 PROCEDURE — 25000003 PHARM REV CODE 250: Performed by: NURSE PRACTITIONER

## 2024-05-11 PROCEDURE — 99284 EMERGENCY DEPT VISIT MOD MDM: CPT | Mod: 25,,, | Performed by: NURSE PRACTITIONER

## 2024-05-11 PROCEDURE — 99283 EMERGENCY DEPT VISIT LOW MDM: CPT | Mod: 25

## 2024-05-11 PROCEDURE — 12001 RPR S/N/AX/GEN/TRNK 2.5CM/<: CPT | Mod: ,,, | Performed by: NURSE PRACTITIONER

## 2024-05-11 RX ORDER — LIDOCAINE HYDROCHLORIDE 10 MG/ML
10 INJECTION INFILTRATION; PERINEURAL
Status: COMPLETED | OUTPATIENT
Start: 2024-05-11 | End: 2024-05-11

## 2024-05-11 RX ADMIN — BACITRACIN ZINC, NEOMYCIN, POLYMYXIN B 1 EACH: 400; 3.5; 5 OINTMENT TOPICAL at 01:05

## 2024-05-11 RX ADMIN — LIDOCAINE HYDROCHLORIDE 10 ML: 10 INJECTION, SOLUTION INFILTRATION; PERINEURAL at 12:05

## 2024-05-11 NOTE — DISCHARGE INSTRUCTIONS
Return in 12 days for suture removal.  Return sooner for any redness, swelling, drainage or for any other new or worrisome symptoms

## 2024-05-11 NOTE — ED PROVIDER NOTES
Encounter Date: 5/11/2024       History     Chief Complaint   Patient presents with    Laceration     Left index finger laceration      68-year-old male presents to the emergency department to be evaluated for a laceration to his left index finger.  He was using a  and accidentally bumped his left index finger.    The history is provided by the patient.   Laceration   The incident occurred 1 to 2 hours ago. The laceration is 1 cm in size. His tetanus status is UTD.     Review of patient's allergies indicates:   Allergen Reactions    Pineapple Hives     Past Medical History:   Diagnosis Date    COVID-19 08/20/2021    Deficiency of testosterone biosynthesis     Diabetes mellitus, type 2     Diabetic eye exam 12/13/2023    Dr. Victor Hugo An, OD - [facility]    Hypertension     Long term (current) use of oral hypoglycemic drugs     Other long term (current) drug therapy     Personal history of COVID-19 08/20/2021    Personal history of malignant melanoma of skin 1998    Personal history of malignant neoplasm of other parts of nervous tissue 04/2015    Referred to Dr. 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; had re-excision of the area 6/4/15 without residual tumor noted; had 30 radiation treatments between 8/4/15-9/15/2015     Past Surgical History:   Procedure Laterality Date    ANGIOGRAM, EXTREMITY, UNILATERAL Right 9/18/2023    Procedure: ANGIOGRAM, EXTREMITY, UNILATERAL;  Surgeon: Tonaj Dodge MD;  Location: [facility] OR;  Service: Vascular;  Laterality: Right;    ENDOVASCULAR REPAIR OF ANEURYSM Right 9/18/2023    Procedure: REPAIR, ANEURYSM, ENDOVASCULAR;  Surgeon: Tonja Dodge MD;  Location: [facility] OR;  Service: Vascular;  Laterality: Right;  popliteal artery    EXCISION OF MELANOMA  1998    TUMOR EXCISION       Family History   Problem Relation Name Age of Onset    Stroke  Mother      Heart disease Father          heart failure    No Known Problems Son       Social History     Tobacco Use    Smoking status: Never     Passive exposure: Never    Smokeless tobacco: Never   Substance Use Topics    Alcohol use: Not Currently    Drug use: Never     Review of Systems   Constitutional:  Negative for chills and fever.   All other systems reviewed and are negative.      Physical Exam     Initial Vitals [05/11/24 1222]   BP Pulse Resp Temp SpO2   (!) 149/86 84 16 97.6 °F (36.4 °C) 96 %      MAP       --         Physical Exam    Vitals reviewed.  Constitutional: He appears well-developed and well-nourished.     Neurological: He is alert and oriented to person, place, and time. He has normal strength. GCS score is 15. GCS eye subscore is 4. GCS verbal subscore is 5. GCS motor subscore is 6.   Skin: Skin is warm and dry. Capillary refill takes less than 2 seconds.   1 cm horizontal laceration to the left distal index finger   Psychiatric: He has a normal mood and affect.         Medical Screening Exam   See Full Note    ED Course   Lac Repair    Date/Time: 5/11/2024 1:53 PM    Performed by: Batsheva Alonzo FNP  Authorized by: Batsheva Alonzo FNP    Consent:     Consent obtained:  Verbal    Risks discussed:  Infection, pain, retained foreign body, tendon damage, vascular damage, nerve damage, need for additional repair, poor wound healing and poor cosmetic result  Laceration details:     Location:  Finger    Finger location:  L index finger    Length (cm):  1  Pre-procedure details:     Preparation:  Patient was prepped and draped in usual sterile fashion  Exploration:     Hemostasis achieved with:  Direct pressure    Imaging obtained: x-ray      Imaging outcome: foreign body not noted      Wound extent: no muscle damage noted, no nerve damage noted, no tendon damage noted, no underlying fracture noted and no vascular damage noted      Contaminated: no    Treatment:     Area cleansed with:   Povidone-iodine    Amount of cleaning:  Standard    Irrigation solution:  Sterile saline and sterile water    Irrigation volume:  200    Irrigation method:  Syringe    Foreign body removal: No foreign bodies.      Debridement:  None    Undermining:  None  Skin repair:     Repair method:  Sutures    Suture size:  4-0    Suture technique:  Simple interrupted    Number of sutures:  7  Repair type:     Repair type:  Simple  Post-procedure details:     Dressing:  Antibiotic ointment    Procedure completion:  Tolerated well, no immediate complications    Labs Reviewed - No data to display       Imaging Results              X-Ray Finger 2 or More Views Left (Final result)  Result time 05/11/24 12:47:05      Final result by Archie Logan DO (05/11/24 12:47:05)                   Impression:      Punctate bony fragment adjacent to the proximal phalanx of the 2nd ray, possibly avulsion fragment.      Electronically signed by: Archie Logan  Date:    05/11/2024  Time:    12:47               Narrative:    EXAMINATION:  XR FINGER 2 OR MORE VIEWS LEFT    CLINICAL HISTORY:  finger injury;    TECHNIQUE:  XR FINGER 2 OR MORE VIEWS LEFT    COMPARISON:  None    FINDINGS:  Punctate bony fragment adjacent to the proximal phalanx of the 2nd ray, possibly avulsion fragment.    No joint abnormality.    No radiopaque foreign bodies.                                       Medications   LIDOcaine HCL 10 mg/ml (1%) injection 10 mL (10 mLs Other Given 5/11/24 1242)   neomycin-bacitracnZn-polymyxnB packet (1 each Topical (Top) Given 5/11/24 1322)     Medical Decision Making  68-year-old male presents to the emergency department to be evaluated for a laceration to his left index finger.  He was using a  and accidentally bumped his left index finger.  X-ray ordered, films reviewed as well as the radiologist's interpretation significant for no acute process  Laceration repaired  Diagnosis: Finger laceration    Amount and/or  Complexity of Data Reviewed  Radiology: ordered.    Risk  OTC drugs.  Prescription drug management.                                      Clinical Impression:   Final diagnoses:  [S09.601T] Laceration of left index finger, foreign body presence unspecified, nail damage status unspecified, initial encounter (Primary)        ED Disposition Condition    Discharge Stable          ED Prescriptions    None       Follow-up Information    None          Batsheva Alonzo FNP  05/11/24 [id number]

## 2024-05-14 ENCOUNTER — OFFICE VISIT (OUTPATIENT)
Dept: FAMILY MEDICINE | Facility: CLINIC | Age: 69
End: 2024-05-14
Payer: COMMERCIAL

## 2024-05-14 VITALS
TEMPERATURE: 99 F | HEIGHT: 66 IN | RESPIRATION RATE: 20 BRPM | SYSTOLIC BLOOD PRESSURE: 115 MMHG | OXYGEN SATURATION: 96 % | BODY MASS INDEX: 28.45 KG/M2 | DIASTOLIC BLOOD PRESSURE: 79 MMHG | WEIGHT: 177 LBS | HEART RATE: 77 BPM

## 2024-05-14 DIAGNOSIS — R79.89 ABNORMAL LIVER FUNCTION TESTS: ICD-10-CM

## 2024-05-14 DIAGNOSIS — Z79.899 ENCOUNTER FOR LONG-TERM (CURRENT) USE OF OTHER MEDICATIONS: ICD-10-CM

## 2024-05-14 DIAGNOSIS — Z12.5 PROSTATE CANCER SCREENING: ICD-10-CM

## 2024-05-14 DIAGNOSIS — I10 BENIGN ESSENTIAL HYPERTENSION: Chronic | ICD-10-CM

## 2024-05-14 DIAGNOSIS — E11.65 TYPE 2 DIABETES MELLITUS WITH HYPERGLYCEMIA, WITHOUT LONG-TERM CURRENT USE OF INSULIN: Primary | Chronic | ICD-10-CM

## 2024-05-14 LAB
ALBUMIN SERPL BCP-MCNC: 4.1 G/DL (ref 3.5–5)
ALP SERPL-CCNC: 73 U/L (ref 45–115)
ALT SERPL W P-5'-P-CCNC: 34 U/L (ref 16–61)
ANION GAP SERPL CALCULATED.3IONS-SCNC: 10 MMOL/L (ref 7–16)
AST SERPL W P-5'-P-CCNC: 18 U/L (ref 15–37)
BILIRUB DIRECT SERPL-MCNC: 0.1 MG/DL (ref 0–0.2)
BILIRUB SERPL-MCNC: 0.6 MG/DL (ref ?–1.2)
BUN SERPL-MCNC: 20 MG/DL (ref 7–18)
BUN/CREAT SERPL: 16 (ref 6–20)
CALCIUM SERPL-MCNC: 9 MG/DL (ref 8.5–10.1)
CHLORIDE SERPL-SCNC: 113 MMOL/L (ref 98–107)
CO2 SERPL-SCNC: 24 MMOL/L (ref 21–32)
CREAT SERPL-MCNC: 1.24 MG/DL (ref 0.7–1.3)
EGFR (NO RACE VARIABLE) (RUSH/TITUS): 64 ML/MIN/1.73M2
EST. AVERAGE GLUCOSE BLD GHB EST-MCNC: 154 MG/DL
GLUCOSE SERPL-MCNC: 109 MG/DL (ref 74–106)
HBA1C MFR BLD HPLC: 7 % (ref 4.5–6.6)
POTASSIUM SERPL-SCNC: 4.1 MMOL/L (ref 3.5–5.1)
PROT SERPL-MCNC: 7.5 G/DL (ref 6.4–8.2)
PSA SERPL-MCNC: 3.07 NG/ML
SODIUM SERPL-SCNC: 143 MMOL/L (ref 136–145)

## 2024-05-14 PROCEDURE — 80076 HEPATIC FUNCTION PANEL: CPT | Mod: ,,, | Performed by: CLINICAL MEDICAL LABORATORY

## 2024-05-14 PROCEDURE — 80048 BASIC METABOLIC PNL TOTAL CA: CPT | Mod: ,,, | Performed by: CLINICAL MEDICAL LABORATORY

## 2024-05-14 PROCEDURE — 99214 OFFICE O/P EST MOD 30 MIN: CPT | Mod: ,,, | Performed by: NURSE PRACTITIONER

## 2024-05-14 PROCEDURE — G0103 PSA SCREENING: HCPCS | Mod: ,,, | Performed by: CLINICAL MEDICAL LABORATORY

## 2024-05-14 PROCEDURE — 83036 HEMOGLOBIN GLYCOSYLATED A1C: CPT | Mod: ,,, | Performed by: CLINICAL MEDICAL LABORATORY

## 2024-05-14 NOTE — ASSESSMENT & PLAN NOTE
Lab Results   Component Value Date    HGBA1C 6.9 (H) 01/09/2024     Continue Xigduo XR and glipizide.  Rarely checks glucose.  Denies hypoglycemia.

## 2024-05-14 NOTE — PROGRESS NOTES
[facility] - FAMILY MEDICINE       PATIENT NAME: Vishal Grace   : 1955    AGE: 68 y.o. DATE OF ENCOUNTER: 24    MRN: [id number]      PCP: Elaine Young FNP    Reason for Visit / Chief Complaint:     274}  Chief Complaint   Patient presents with    Follow-up    Diabetes     Patient presents to clinic for 4m f/u       Subjective:     HPI:    Presents for 4 mth f/u T2DM & HTN.  Doing okay, no probs.    Had to have stitches in finger Saturday & will have them removed in 12 days.     Review of Systems:     Review of Systems   Constitutional: Negative.    HENT: Negative.     Respiratory: Negative.     Cardiovascular: Negative.    Gastrointestinal: Negative.    Genitourinary: Negative.    Skin: Negative.    Neurological: Negative.    Psychiatric/Behavioral: Negative.         Allergies and Meds: 274}     Review of patient's allergies indicates:   Allergen Reactions    Pineapple Hives        Current Outpatient Medications   Medication Sig Dispense Refill    clopidogreL (PLAVIX) 75 mg tablet Take 1 tablet (75 mg total) by mouth once daily. 30 tablet 11    dapaglifloz propaned-metformin (XIGDUO XR) 5-1,000 mg Take 1 tablet by mouth every morning. (Patient taking differently: Take 1 tablet by mouth every evening.) 90 tablet 3    glipiZIDE 5 MG TR24 Take 1 tablet (5 mg total) by mouth daily with breakfast. 90 tablet 1    lisinopriL (PRINIVIL,ZESTRIL) 20 MG tablet Take 1 tablet (20 mg total) by mouth once daily. 90 tablet 3    meloxicam (MOBIC) 15 MG tablet Take 1 tablet (15 mg total) by mouth daily as needed for Pain. Take with food. Avoid other NSAIDs when taking. 30 tablet 0    pravastatin (PRAVACHOL) 40 MG tablet Take 1 tablet (40 mg total) by mouth once daily. 90 tablet 3    tamsulosin (FLOMAX) 0.4 mg Cap Take 1 capsule (0.4 mg total) by mouth once daily. 90 capsule 3     No current facility-administered medications for this visit.       Labs:274}   I have reviewed labs below:    Lab  Results   Component Value Date    WBC 6.70 01/09/2024    RBC 5.63 01/09/2024    HGB 17.7 01/09/2024    HCT 51.1 01/09/2024     01/09/2024     01/09/2024    K 4.5 01/09/2024     01/09/2024    CALCIUM 9.2 01/09/2024     (H) 01/09/2024    BUN 20 (H) 01/09/2024    CREATININE 1.38 (H) 01/09/2024    EGFRNONAA 65 01/11/2022    ALT 65 (H) 01/09/2024    AST 39 (H) 01/09/2024    CHOL 140 01/09/2024    TRIG 89 01/09/2024    HDL 44 01/09/2024    LDLCALC 78 01/09/2024    TSH 3.680 01/09/2024    PSA 3.310 05/18/2023    HGBA1C 6.9 (H) 01/09/2024    MICROALBUR 0.5 01/09/2024     Medical History: 274}     Past Medical History:   Diagnosis Date    COVID-19 08/20/2021    Deficiency of testosterone biosynthesis     Diabetes mellitus, type 2     Diabetic eye exam 12/13/2023    Dr. Victor Hugo An, OD - [facility]    Hypertension     Long term (current) use of oral hypoglycemic drugs     Other long term (current) drug therapy     Personal history of COVID-19 08/20/2021    Personal history of malignant melanoma of skin 1998    Personal history of malignant neoplasm of other parts of nervous tissue 04/2015    Referred to Dr. 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; had re-excision of the area 6/4/15 without residual tumor noted; had 30 radiation treatments between 8/4/15-9/15/2015      Social History     Tobacco Use   Smoking Status Never    Passive exposure: Never   Smokeless Tobacco Never      Past Surgical History:   Procedure Laterality Date    ANGIOGRAM, EXTREMITY, UNILATERAL Right 9/18/2023    Procedure: ANGIOGRAM, EXTREMITY, UNILATERAL;  Surgeon: Tonja Dodge MD;  Location: [facility];  Service: Vascular;  Laterality: Right;    ENDOVASCULAR REPAIR OF ANEURYSM Right 9/18/2023    Procedure: REPAIR, ANEURYSM, ENDOVASCULAR;  Surgeon: Tonja Dodge MD;  Location: [facility] OR;   "Service: Vascular;  Laterality: Right;  popliteal artery    EXCISION OF MELANOMA  1998    TUMOR EXCISION          Health Maintenance: {      Health Maintenance         Date Due Completion Date    RSV Vaccine (Age 60+ and Pregnant patients) (1 - 1-dose 60+ series) Never done ---    Hemoglobin A1c 04/09/2024 1/9/2024    PROSTATE-SPECIFIC ANTIGEN 05/18/2024 5/18/2023    Override on 5/18/2020: Done    Eye Exam 12/13/2024 12/13/2023    Diabetes Urine Screening 01/09/2025 1/9/2024    Foot Exam 01/09/2025 1/9/2024    Lipid Panel 01/09/2025 1/9/2024    High Dose Statin 05/14/2025 5/14/2024    Colorectal Cancer Screening 04/21/2030 4/21/2023    TETANUS VACCINE 05/25/2031 5/25/2021          Immunization History   Administered Date(s) Administered    Pneumococcal Conjugate - 20 Valent 01/19/2023    Pneumococcal Polysaccharide - 23 Valent 01/16/2017, 01/11/2022    Tdap 05/25/2021    Zoster Recombinant 01/05/2023, 05/18/2023       Objective:  274}   Vital Signs  Temp: 98.5 °F (36.9 °C)  Temp Source: Oral  Pulse: 77  Resp: 20  SpO2: 96 %  BP: 115/79  BP Location: Left arm  Patient Position: Sitting  Pain Score: 0-No pain  Height and Weight  Height: 5' 6" (167.6 cm)  Weight: 80.3 kg (177 lb)  BSA (Calculated - sq m): 1.93 sq meters  BMI (Calculated): 28.6  Weight in (lb) to have BMI = 25: 154.6    Over the last two weeks how often have you been bothered by little interest or pleasure in doing things: 0  Over the last two weeks how often have you been bothered by feeling down, depressed or hopeless: 0  PHQ-2 Total Score: 0  PHQ-9 Score: 0  PHQ-9 Interpretation: Minimal or None    Wt Readings from Last 3 Encounters:   05/14/24 80.3 kg (177 lb)   05/11/24 80.3 kg (177 lb)   01/09/24 80.7 kg (178 lb)     Physical Exam  Vitals and nursing note reviewed.   Constitutional:       General: He is not in acute distress.     Appearance: Normal appearance. He is not ill-appearing.   HENT:      Head: Normocephalic.   Eyes:      " Conjunctiva/sclera: Conjunctivae normal.   Cardiovascular:      Rate and Rhythm: Normal rate and regular rhythm.      Heart sounds: Normal heart sounds.   Pulmonary:      Effort: Pulmonary effort is normal. No respiratory distress.      Breath sounds: Normal breath sounds. No wheezing, rhonchi or rales.   Musculoskeletal:      Right lower leg: No edema.      Left lower leg: No edema.   Lymphadenopathy:      Cervical: No cervical adenopathy.   Skin:     General: Skin is warm and dry.      Coloration: Skin is not jaundiced or pale.   Neurological:      Mental Status: He is alert and oriented to person, place, and time.      Gait: Gait normal.   Psychiatric:         Mood and Affect: Mood normal.         Behavior: Behavior normal.         Thought Content: Thought content normal.         Judgment: Judgment normal.          Assessment and Plan: 274}     1. Type 2 diabetes mellitus with hyperglycemia, without long-term current use of insulin  Assessment & Plan:  Lab Results   Component Value Date    HGBA1C 6.9 (H) 01/09/2024     Continue Xigduo XR and glipizide.  Rarely checks glucose.  Denies hypoglycemia.     Orders:  -     Basic Metabolic Panel; Future; Expected date: 05/14/2024  -     Hemoglobin A1C; Future; Expected date: 05/14/2024    2. Benign essential hypertension  Assessment & Plan:  Controlled, continue lisinopril 20 mg daily.       3. Encounter for long-term (current) use of other medications    4. Prostate cancer screening  -     PSA, Screening; Future; Expected date: 05/14/2024    5. Abnormal liver function tests  Assessment & Plan:  Mild elevation of ALT/AST on last labs.  Is fasting today so will recheck.    Orders:  -     Hepatic Function Panel; Future; Expected date: 05/14/2024      Diagnosis, risks, benefits, and side effects of meds and treatment plan were discussed with the patient.  All questions were answered to the satisfaction of the patient, and pt verbalized understanding and agreement to  treatment plan.      Follow up in about 4 months (around 9/14/2024) for T2DM, with nonfasting lab.    Signature:  ANABELLA Cruz-BC    Future Appointments   Date Time Provider Department Center   9/16/2024  1:20 PM Elaine Young FNP [facility]   10[facility]2/2024  [facility]   10/2/2024  2:15 PM Tonja Dodge MD [facility]

## 2024-05-17 NOTE — PROGRESS NOTES
Call pt and review results.  A1c stable, could be better with goal < 6.5%.  Needs to increase water intake.  PSA normal.  LFTs are back to normal.

## 2024-05-23 ENCOUNTER — HOSPITAL ENCOUNTER (EMERGENCY)
Facility: HOSPITAL | Age: 69
Discharge: HOME | End: 2024-05-23
Payer: COMMERCIAL

## 2024-05-23 VITALS
SYSTOLIC BLOOD PRESSURE: 129 MMHG | HEIGHT: 65 IN | TEMPERATURE: 98 F | RESPIRATION RATE: 16 BRPM | OXYGEN SATURATION: 95 % | HEART RATE: 87 BPM | BODY MASS INDEX: 29.17 KG/M2 | WEIGHT: 175.06 LBS | DIASTOLIC BLOOD PRESSURE: 92 MMHG

## 2024-05-23 DIAGNOSIS — Z48.02 VISIT FOR SUTURE REMOVAL: Primary | ICD-10-CM

## 2024-05-23 PROCEDURE — 99999 HC NO LEVEL OF SERVICE - ED ONLY: CPT

## 2024-05-23 NOTE — ED PROVIDER NOTES
Encounter Date: 5/23/2024       History   No chief complaint on file.    68 year old male presents to the emergency department to be evaluated for scheduled suture removal. He had a laceration on his left index finger repaired 12 days ago. Denies any complaints.     The history is provided by the patient.   Suture / Staple Removal   The sutures were placed 11 to 14 days ago. There has been no drainage from the wound. There is no redness present. There is no swelling present. There is no pain present.     Review of patient's allergies indicates:   Allergen Reactions    Pineapple Hives     Past Medical History:   Diagnosis Date    COVID-19 08/20/2021    Deficiency of testosterone biosynthesis     Diabetes mellitus, type 2     Diabetic eye exam 12/13/2023    Dr. Victor Hugo An, OD - [facility]    Hypertension     Long term (current) use of oral hypoglycemic drugs     Other long term (current) drug therapy     Personal history of COVID-19 08/20/2021    Personal history of malignant melanoma of skin 1998    Personal history of malignant neoplasm of other parts of nervous tissue 04/2015    Referred to Dr. 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; had re-excision of the area 6/4/15 without residual tumor noted; had 30 radiation treatments between 8/4/15-9/15/2015     Past Surgical History:   Procedure Laterality Date    ANGIOGRAM, EXTREMITY, UNILATERAL Right 9/18/2023    Procedure: ANGIOGRAM, EXTREMITY, UNILATERAL;  Surgeon: Tonja Dodge MD;  Location: [facility] [place] OR;  Service: Vascular;  Laterality: Right;    ENDOVASCULAR REPAIR OF ANEURYSM Right 9/18/2023    Procedure: REPAIR, ANEURYSM, ENDOVASCULAR;  Surgeon: Tonja Dodge MD;  Location: [facility] [place] OR;  Service: Vascular;  Laterality: Right;  popliteal artery    EXCISION OF MELANOMA  1998    TUMOR EXCISION       Family History   Problem Relation  Name Age of Onset    Stroke Mother      Heart disease Father          heart failure    No Known Problems Son       Social History     Tobacco Use    Smoking status: Never     Passive exposure: Never    Smokeless tobacco: Never   Substance Use Topics    Alcohol use: Not Currently    Drug use: Never     Review of Systems   Constitutional:  Negative for chills and fever.   All other systems reviewed and are negative.      Physical Exam     Initial Vitals   BP Pulse Resp Temp SpO2   -- -- -- -- --      MAP       --         Physical Exam    Vitals reviewed.  Constitutional: He appears well-developed and well-nourished.     Neurological: He is alert.   Skin: Skin is warm and dry.   Sutures to the left index finger dry and intact without any redness, swelling or drainage         Medical Screening Exam   See Full Note    ED Course   Procedures  Labs Reviewed - No data to display       Imaging Results    None          Medications - No data to display  Medical Decision Making  68 year old male presents to the emergency department to be evaluated for scheduled suture removal. He had a laceration on his left index finger repaired 12 days ago. Denies any complaints.   Diagnosis: suture removal                                      Clinical Impression:   Final diagnoses:  [Z48.02] Visit for suture removal (Primary)        ED Disposition Condition    Discharge Stable          ED Prescriptions    None       Follow-up Information    None          Batsheva Alonzo, HERBERT  05/23/24 [id number]

## 2024-07-02 ENCOUNTER — CLINICAL SUPPORT (OUTPATIENT)
Dept: PRIMARY CARE CLINIC | Facility: CLINIC | Age: 69
End: 2024-07-02

## 2024-07-02 DIAGNOSIS — Z02.4 ENCOUNTER FOR DEPARTMENT OF TRANSPORTATION (DOT) EXAMINATION FOR DRIVING LICENSE RENEWAL: Primary | ICD-10-CM

## 2024-07-02 NOTE — PROGRESS NOTES
Subjective     Patient ID: Vishal Grace is a 68 y.o. male.    Chief Complaint: No chief complaint on file.    HPI  Review of Systems       Objective     Physical Exam       Assessment and Plan     1. Encounter for Department of Transportation (DOT) examination for driving license renewal        See scanned documents in .            No follow-ups on file.

## 2024-09-16 ENCOUNTER — OFFICE VISIT (OUTPATIENT)
Dept: FAMILY MEDICINE | Facility: CLINIC | Age: 69
End: 2024-09-16
Payer: COMMERCIAL

## 2024-09-16 VITALS
RESPIRATION RATE: 18 BRPM | DIASTOLIC BLOOD PRESSURE: 81 MMHG | HEIGHT: 66 IN | BODY MASS INDEX: 28.93 KG/M2 | WEIGHT: 180 LBS | SYSTOLIC BLOOD PRESSURE: 120 MMHG | HEART RATE: 75 BPM | TEMPERATURE: 98 F | OXYGEN SATURATION: 97 %

## 2024-09-16 DIAGNOSIS — E78.49 OTHER HYPERLIPIDEMIA: Chronic | ICD-10-CM

## 2024-09-16 DIAGNOSIS — Z85.820 PERSONAL HISTORY OF MALIGNANT MELANOMA OF SKIN: ICD-10-CM

## 2024-09-16 DIAGNOSIS — E11.65 TYPE 2 DIABETES MELLITUS WITH HYPERGLYCEMIA, WITHOUT LONG-TERM CURRENT USE OF INSULIN: Primary | Chronic | ICD-10-CM

## 2024-09-16 DIAGNOSIS — I10 BENIGN ESSENTIAL HYPERTENSION: Chronic | ICD-10-CM

## 2024-09-16 DIAGNOSIS — L98.9 SKIN LESION OF HAND: ICD-10-CM

## 2024-09-16 PROBLEM — R97.20 PSA ELEVATION: Status: RESOLVED | Noted: 2023-01-10 | Resolved: 2024-09-16

## 2024-09-16 LAB
ANION GAP SERPL CALCULATED.3IONS-SCNC: 14 MMOL/L (ref 7–16)
BUN SERPL-MCNC: 19 MG/DL (ref 7–18)
BUN/CREAT SERPL: 17 (ref 6–20)
CALCIUM SERPL-MCNC: 9 MG/DL (ref 8.5–10.1)
CHLORIDE SERPL-SCNC: 104 MMOL/L (ref 98–107)
CO2 SERPL-SCNC: 24 MMOL/L (ref 21–32)
CREAT SERPL-MCNC: 1.1 MG/DL (ref 0.7–1.3)
EGFR (NO RACE VARIABLE) (RUSH/TITUS): 74 ML/MIN/1.73M2
EST. AVERAGE GLUCOSE BLD GHB EST-MCNC: 160 MG/DL
GLUCOSE SERPL-MCNC: 75 MG/DL (ref 74–106)
HBA1C MFR BLD HPLC: 7.2 % (ref 4.5–6.6)
POTASSIUM SERPL-SCNC: 3.7 MMOL/L (ref 3.5–5.1)
SODIUM SERPL-SCNC: 138 MMOL/L (ref 136–145)

## 2024-09-16 PROCEDURE — 99214 OFFICE O/P EST MOD 30 MIN: CPT | Mod: ,,, | Performed by: NURSE PRACTITIONER

## 2024-09-16 PROCEDURE — 80048 BASIC METABOLIC PNL TOTAL CA: CPT | Mod: ,,, | Performed by: CLINICAL MEDICAL LABORATORY

## 2024-09-16 PROCEDURE — 83036 HEMOGLOBIN GLYCOSYLATED A1C: CPT | Mod: ,,, | Performed by: CLINICAL MEDICAL LABORATORY

## 2024-09-16 RX ORDER — DAPAGLIFLOZIN AND METFORMIN HYDROCHLORIDE 5; 1000 MG/1; MG/1
1 TABLET, FILM COATED, EXTENDED RELEASE ORAL DAILY
Qty: 90 TABLET | Refills: 3 | Status: SHIPPED | OUTPATIENT
Start: 2024-09-16 | End: 2024-09-17

## 2024-09-16 RX ORDER — PRAVASTATIN SODIUM 40 MG/1
40 TABLET ORAL DAILY
Qty: 90 TABLET | Refills: 3 | Status: SHIPPED | OUTPATIENT
Start: 2024-09-16 | End: 2025-09-16

## 2024-09-16 NOTE — ASSESSMENT & PLAN NOTE
Warty growth dorsal right hand.  Refer to Dermatology.  Also has hx melanoma.  Needs skin check for other abnormalities.

## 2024-09-16 NOTE — ASSESSMENT & PLAN NOTE
Lab Results   Component Value Date    HGBA1C 7.0 (H) 05/14/2024     Continue Xigduo XR and glipizide.  Rarely checks glucose.  Denies hypoglycemia.

## 2024-09-16 NOTE — PROGRESS NOTES
[facility]  [address] [place] [address]  Phone: [phone]  Fax: [phone]       PATIENT NAME: Vishal Grace   : 1955    AGE: 68 y.o. DATE OF ENCOUNTER: 24    MRN: [id number]      PCP: Elaine Young FNP    Subjective:     Reason for Visit / Chief Complaint:     274}  Chief Complaint   Patient presents with    Diabetes     Patient reports to the clinic for 4 month follow up.    Health Maintenance     Care gaps addressed, patient due for A1C, declines all vaccines.    Rowena Gama, CMA     4-mth f/u uncontrolled T2DM and HTN    Only problem is that distal left index finger w/ history laceration feels numb and gets irritated at times. Doesn't bother him bad enough to take anything.    No established Dermatologist, hx melanoma.  Has warty growth on R hand that sometimes feels irritated. Also growth on back that his wife sometimes peels off.    Review of Systems:     Review of Systems   Constitutional: Negative.    Respiratory: Negative.     Cardiovascular: Negative.    Neurological: Negative.        Allergies and Meds: 274}     Review of patient's allergies indicates:   Allergen Reactions    Pineapple Hives        Current Outpatient Medications   Medication Sig Dispense Refill    clopidogreL (PLAVIX) 75 mg tablet Take 1 tablet (75 mg total) by mouth once daily. 30 tablet 11    glipiZIDE 5 MG TR24 Take 1 tablet (5 mg total) by mouth daily with breakfast. 90 tablet 3    lisinopriL (PRINIVIL,ZESTRIL) 20 MG tablet Take 1 tablet (20 mg total) by mouth once daily. 90 tablet 3    meloxicam (MOBIC) 15 MG tablet Take 1 tablet (15 mg total) by mouth daily as needed for Pain. Take with food. Avoid other NSAIDs when taking. 30 tablet 0    tamsulosin (FLOMAX) 0.4 mg Cap Take 1 capsule (0.4 mg total) by mouth once daily. 90 capsule 3    dapaglifloz propaned-metformin (XIGDUO XR) 5-1,000 mg Take 1 tablet by mouth once daily. 90 tablet 3    pravastatin (PRAVACHOL) 40  MG tablet Take 1 tablet (40 mg total) by mouth once daily. 90 tablet 3     No current facility-administered medications for this visit.       Labs:274}   I have reviewed labs below:    Lab Results   Component Value Date    WBC 6.70 01/09/2024    RBC 5.63 01/09/2024    HGB 17.7 01/09/2024    HCT 51.1 01/09/2024     01/09/2024     05/14/2024    K 4.1 05/14/2024     (H) 05/14/2024    CALCIUM 9.0 05/14/2024     (H) 05/14/2024    BUN 20 (H) 05/14/2024    CREATININE 1.24 05/14/2024    EGFRNONAA 65 01/11/2022    ALT 34 05/14/2024    AST 18 05/14/2024    CHOL 140 01/09/2024    TRIG 89 01/09/2024    HDL 44 01/09/2024    LDLCALC 78 01/09/2024    TSH 3.680 01/09/2024    PSA 3.070 05/14/2024    HGBA1C 7.0 (H) 05/14/2024    MICROALBUR 0.5 01/09/2024     Medical History: 274}     Past Medical History:   Diagnosis Date    COVID-19 08/20/2021    Deficiency of testosterone biosynthesis     Diabetes mellitus, type 2     Diabetic eye exam 12/13/2023    Dr. Victor Hugo An, OD - [facility]    Hypertension     Long term (current) use of oral hypoglycemic drugs     Other long term (current) drug therapy     Personal history of COVID-19 08/20/2021    Personal history of malignant melanoma of skin 1998    Personal history of malignant neoplasm of other parts of nervous tissue 04/2015    Referred to Dr. 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; had re-excision of the area 6/4/15 without residual tumor noted; had 30 radiation treatments between 8/4/15-9/15/2015      Social History     Tobacco Use   Smoking Status Never    Passive exposure: Never   Smokeless Tobacco Never      Past Surgical History:   Procedure Laterality Date    ANGIOGRAM, EXTREMITY, UNILATERAL Right 9/18/2023    Procedure: ANGIOGRAM, EXTREMITY, UNILATERAL;  Surgeon: Tonja Dodge MD;  Location: [facility] [place];  Service:  "Vascular;  Laterality: Right;    ENDOVASCULAR REPAIR OF ANEURYSM Right 9/18/2023    Procedure: REPAIR, ANEURYSM, ENDOVASCULAR;  Surgeon: Tonja Dodge MD;  Location: [facility];  Service: Vascular;  Laterality: Right;  popliteal artery    EXCISION OF MELANOMA  1998    TUMOR EXCISION          Health Maintenance:      Health Maintenance Topics with due status: Not Due       Topic Last Completion Date    TETANUS VACCINE 05/25/2021    Colorectal Cancer Screening 04/21/2023    Diabetes Urine Screening 01/09/2024    Foot Exam 01/09/2024    Lipid Panel 01/09/2024    PROSTATE-SPECIFIC ANTIGEN 05/14/2024    High Dose Statin 05/14/2024       Objective:  274}   Vital Signs  Temp: 98.2 °F (36.8 °C)  Temp Source: Oral  Pulse: 75  Resp: 18  SpO2: 97 %  BP: 120/81  BP Location: Left arm  Patient Position: Sitting  Pain Score: 0-No pain  Height and Weight  Height: 5' 6" (167.6 cm)  Weight: 81.6 kg (180 lb)  BSA (Calculated - sq m): 1.95 sq meters  BMI (Calculated): 29.1  Weight in (lb) to have BMI = 25: 154.6    Over the last two weeks how often have you been bothered by little interest or pleasure in doing things: 0  Over the last two weeks how often have you been bothered by feeling down, depressed or hopeless: 0  PHQ-2 Total Score: 0  PHQ-9 Score: 0  PHQ-9 Interpretation: Minimal or None    Wt Readings from Last 3 Encounters:   09/16/24 81.6 kg (180 lb)   05/23/24 79.4 kg (175 lb 0.7 oz)   05/14/24 80.3 kg (177 lb)     Physical Exam  Vitals and nursing note reviewed.   Constitutional:       General: He is not in acute distress.     Appearance: Normal appearance. He is not ill-appearing.   HENT:      Head: Normocephalic.   Eyes:      Conjunctiva/sclera: Conjunctivae normal.   Cardiovascular:      Rate and Rhythm: Normal rate and regular rhythm.      Heart sounds: Normal heart sounds.   Pulmonary:      Effort: Pulmonary effort is normal. No respiratory distress.      Breath sounds: Normal breath sounds.   Skin:     General: " Skin is warm and dry.      Coloration: Skin is not jaundiced or pale.      Comments: Warty, flesh colored to whitish colored, growth to dorsal right hand without redness.  Borders are regular.   Neurological:      Mental Status: He is alert and oriented to person, place, and time.      Gait: Gait normal.   Psychiatric:         Mood and Affect: Mood normal.         Behavior: Behavior normal.         Thought Content: Thought content normal.         Judgment: Judgment normal.          Assessment and Plan: 274}     1. Type 2 diabetes mellitus with hyperglycemia, without long-term current use of insulin  Assessment & Plan:  Lab Results   Component Value Date    HGBA1C 7.0 (H) 05/14/2024     Continue Xigduo XR and glipizide.  Rarely checks glucose.  Denies hypoglycemia.     Orders:  -     Hemoglobin A1C; Future; Expected date: 09/16/2024  -     Basic Metabolic Panel; Future; Expected date: 09/16/2024  -     dapaglifloz propaned-metformin (XIGDUO XR) 5-1,000 mg; Take 1 tablet by mouth once daily.  Dispense: 90 tablet; Refill: 3    2. Benign essential hypertension  Assessment & Plan:  Controlled, continue lisinopril 20 mg daily.       3. Skin lesion of hand  Assessment & Plan:  Warty growth dorsal right hand.  Refer to Dermatology.  Also has hx melanoma.  Needs skin check for other abnormalities.    Orders:  -     Ambulatory referral/consult to Dermatology    4. Personal history of malignant melanoma of skin  -     Ambulatory referral/consult to Dermatology    5. Other hyperlipidemia  -     pravastatin (PRAVACHOL) 40 MG tablet; Take 1 tablet (40 mg total) by mouth once daily.  Dispense: 90 tablet; Refill: 3      Diagnosis, risks, benefits, and side effects of any meds and treatment plan were discussed with the patient.  Patient to call or follow-up with any new or worsening symptoms or problems prior to next appointment.  Go to ER for any urgent complications.  All questions were answered to the satisfaction of the patient,  and pt verbalized understanding and agreement to treatment plan.      Follow up in about 4 months (around 1/16/2025) for T2DM, hyperlipidemia, hypertension, with fasting labs.    Signature:  ANABELLA Cruz-BC    Future Appointments   Date Time Provider Department Center   10/2/2024  1:30 PM [facility] [address] [place] [address]   10/2/2024  2:15 PM [facility]   1/16/2025  8:20 AM Elaine Young FNP [facility]

## 2024-09-17 DIAGNOSIS — E11.65 TYPE 2 DIABETES MELLITUS WITH HYPERGLYCEMIA, WITHOUT LONG-TERM CURRENT USE OF INSULIN: Chronic | ICD-10-CM

## 2024-09-17 RX ORDER — DAPAGLIFLOZIN AND METFORMIN HYDROCHLORIDE 5; 1000 MG/1; MG/1
2 TABLET, FILM COATED, EXTENDED RELEASE ORAL DAILY
Qty: 90 TABLET | Refills: 3 | Status: SHIPPED | OUTPATIENT
Start: 2024-09-17

## 2024-09-17 NOTE — PROGRESS NOTES
Stable kidney function.  A1c continues to increase slightly with goal < 6.5%.  I would like him to try increasing Xigduo to 2 tablets daily.  He can take them both together or 1 in the morning and 1 in the evening.

## 2024-10-02 ENCOUNTER — OFFICE VISIT (OUTPATIENT)
Dept: VASCULAR SURGERY | Facility: CLINIC | Age: 69
End: 2024-10-02
Payer: COMMERCIAL

## 2024-10-02 ENCOUNTER — HOSPITAL ENCOUNTER (OUTPATIENT)
Dept: RADIOLOGY | Facility: HOSPITAL | Age: 69
Discharge: HOME | End: 2024-10-02
Attending: SURGERY
Payer: COMMERCIAL

## 2024-10-02 VITALS — WEIGHT: 179.88 LBS | BODY MASS INDEX: 28.91 KG/M2 | HEIGHT: 66 IN

## 2024-10-02 DIAGNOSIS — I72.4 POPLITEAL ANEURYSM: ICD-10-CM

## 2024-10-02 DIAGNOSIS — I72.4 POPLITEAL ANEURYSM: Primary | ICD-10-CM

## 2024-10-02 PROCEDURE — 93926 LOWER EXTREMITY STUDY: CPT | Mod: TC,RT

## 2024-10-02 PROCEDURE — 99999 PR PBB SHADOW E&M-EST. PATIENT-LVL III: CPT | Mod: PBBFAC,,, | Performed by: NURSE PRACTITIONER

## 2024-10-02 PROCEDURE — 99213 OFFICE O/P EST LOW 20 MIN: CPT | Mod: PBBFAC,25 | Performed by: NURSE PRACTITIONER

## 2024-10-02 PROCEDURE — 99214 OFFICE O/P EST MOD 30 MIN: CPT | Mod: S$PBB,,, | Performed by: NURSE PRACTITIONER

## 2024-10-02 PROCEDURE — 93926 LOWER EXTREMITY STUDY: CPT | Mod: 26,RT,, | Performed by: SURGERY

## 2024-10-02 NOTE — PROGRESS NOTES
Subjective:       Patient ID: Vishal Grace is a 68 y.o. male.    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 Dodge approximately 1 year ago on Plavix he is doing well no complaints of leg pain arterial duplex today, patent right popliteal artery stent , in good position  without filling of aneurysms  family history includes Heart disease in his father; No Known Problems in his son; Stroke in his mother.  Past Medical History:   Diagnosis Date    COVID-19 08/20/2021    Deficiency of testosterone biosynthesis     Diabetes mellitus, type 2     Diabetic eye exam 12/13/2023    Dr. Victor Hugo An, OD - [facility]    Hypertension     Long term (current) use of oral hypoglycemic drugs     Other long term (current) drug therapy     Personal history of COVID-19 08/20/2021    Personal history of malignant melanoma of skin 1998    Personal history of malignant neoplasm of other parts of nervous tissue 04/2015    Referred to Dr. 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; had re-excision of the area 6/4/15 without residual tumor noted; had 30 radiation treatments between 8/4/15-9/15/2015      Past Surgical History:   Procedure Laterality Date    ANGIOGRAM, EXTREMITY, UNILATERAL Right 9/18/2023    Procedure: ANGIOGRAM, EXTREMITY, UNILATERAL;  Surgeon: Tonja Dodge MD;  Location: [facility] OR;  Service: Vascular;  Laterality: Right;    ENDOVASCULAR REPAIR OF ANEURYSM Right 9/18/2023    Procedure: REPAIR, ANEURYSM, ENDOVASCULAR;  Surgeon: Tonja Dodge MD;  Location: [facility] OR;  Service: Vascular;  Laterality: Right;  popliteal artery    EXCISION OF MELANOMA  1998    TUMOR EXCISION         reports that he has never smoked. He has never been exposed to tobacco smoke. He  "has never used smokeless tobacco. He reports that he does not currently use alcohol. He reports that he does not use drugs.   Follow-up      Review of Systems   Cardiovascular:  Negative for claudication.   Integumentary:  Negative for wound.         Objective:      Ht 5' 6" (1.676 m)   Wt 81.6 kg (179 lb 14.3 oz)   BMI 29.04 kg/m²    Physical Exam  Vitals and nursing note reviewed.   Constitutional:       Appearance: Normal appearance.   HENT:      Head: Normocephalic.      Mouth/Throat:      Mouth: Mucous membranes are moist.   Eyes:      Conjunctiva/sclera: Conjunctivae normal.   Cardiovascular:      Rate and Rhythm: Normal rate and regular rhythm.   Pulmonary:      Effort: Pulmonary effort is normal.   Abdominal:      Palpations: Abdomen is soft.   Skin:     General: Skin is warm and dry.   Neurological:      Mental Status: He is alert and oriented to person, place, and time.   Psychiatric:         Mood and Affect: Mood normal.           Assessment:       1. Popliteal aneurysm        Plan:       Continue Plavix follow-up in 1 year with duplex of right popliteal artery      "

## 2024-10-23 DIAGNOSIS — I72.4 POPLITEAL ANEURYSM: ICD-10-CM

## 2024-12-31 RX ORDER — CLOPIDOGREL BISULFATE 75 MG/1
75 TABLET ORAL
Qty: 90 TABLET | Refills: 3 | Status: SHIPPED | OUTPATIENT
Start: 2024-12-31

## 2025-01-16 ENCOUNTER — OFFICE VISIT (OUTPATIENT)
Dept: FAMILY MEDICINE | Facility: CLINIC | Age: 70
End: 2025-01-16
Payer: COMMERCIAL

## 2025-01-16 VITALS
BODY MASS INDEX: 28.28 KG/M2 | TEMPERATURE: 99 F | OXYGEN SATURATION: 96 % | RESPIRATION RATE: 18 BRPM | WEIGHT: 176 LBS | HEART RATE: 78 BPM | SYSTOLIC BLOOD PRESSURE: 97 MMHG | HEIGHT: 66 IN | DIASTOLIC BLOOD PRESSURE: 66 MMHG

## 2025-01-16 DIAGNOSIS — I10 BENIGN ESSENTIAL HYPERTENSION: Chronic | ICD-10-CM

## 2025-01-16 DIAGNOSIS — E11.65 TYPE 2 DIABETES MELLITUS WITH HYPERGLYCEMIA, WITHOUT LONG-TERM CURRENT USE OF INSULIN: Primary | Chronic | ICD-10-CM

## 2025-01-16 DIAGNOSIS — Z79.899 OTHER LONG TERM (CURRENT) DRUG THERAPY: ICD-10-CM

## 2025-01-16 DIAGNOSIS — R39.12 BENIGN PROSTATIC HYPERPLASIA WITH WEAK URINARY STREAM: Chronic | ICD-10-CM

## 2025-01-16 DIAGNOSIS — E78.49 OTHER HYPERLIPIDEMIA: Chronic | ICD-10-CM

## 2025-01-16 DIAGNOSIS — N40.1 BENIGN PROSTATIC HYPERPLASIA WITH WEAK URINARY STREAM: Chronic | ICD-10-CM

## 2025-01-16 LAB
ALBUMIN SERPL BCP-MCNC: 4.3 G/DL (ref 3.4–4.8)
ALBUMIN/GLOB SERPL: 1.5 {RATIO}
ALP SERPL-CCNC: 65 U/L (ref 40–150)
ALT SERPL W P-5'-P-CCNC: 27 U/L
ANION GAP SERPL CALCULATED.3IONS-SCNC: 12 MMOL/L (ref 7–16)
AST SERPL W P-5'-P-CCNC: 22 U/L (ref 5–34)
BASOPHILS # BLD AUTO: 0.03 K/UL (ref 0–0.2)
BASOPHILS NFR BLD AUTO: 0.5 % (ref 0–1)
BILIRUB SERPL-MCNC: 0.8 MG/DL
BUN SERPL-MCNC: 27 MG/DL (ref 8–26)
BUN/CREAT SERPL: 22 (ref 6–20)
CALCIUM SERPL-MCNC: 8.8 MG/DL (ref 8.8–10)
CHLORIDE SERPL-SCNC: 108 MMOL/L (ref 98–107)
CHOLEST SERPL-MCNC: 127 MG/DL
CHOLEST/HDLC SERPL: 3.2 {RATIO}
CO2 SERPL-SCNC: 23 MMOL/L (ref 23–31)
CREAT SERPL-MCNC: 1.24 MG/DL (ref 0.72–1.25)
CREAT UR-MCNC: 94 MG/DL (ref 23–375)
DIFFERENTIAL METHOD BLD: ABNORMAL
EGFR (NO RACE VARIABLE) (RUSH/TITUS): 63 ML/MIN/1.73M2
EOSINOPHIL # BLD AUTO: 0.22 K/UL (ref 0–0.5)
EOSINOPHIL NFR BLD AUTO: 4 % (ref 1–4)
ERYTHROCYTE [DISTWIDTH] IN BLOOD BY AUTOMATED COUNT: 13.3 % (ref 11.5–14.5)
EST. AVERAGE GLUCOSE BLD GHB EST-MCNC: 183 MG/DL
GLOBULIN SER-MCNC: 2.8 G/DL (ref 2–4)
GLUCOSE SERPL-MCNC: 167 MG/DL (ref 82–115)
HBA1C MFR BLD HPLC: 8 %
HCT VFR BLD AUTO: 49.9 % (ref 40–54)
HDLC SERPL-MCNC: 40 MG/DL (ref 35–60)
HGB BLD-MCNC: 17 G/DL (ref 13.5–18)
IMM GRANULOCYTES # BLD AUTO: 0.02 K/UL (ref 0–0.04)
IMM GRANULOCYTES NFR BLD: 0.4 % (ref 0–0.4)
LDLC SERPL CALC-MCNC: 70 MG/DL
LDLC/HDLC SERPL: 1.8 {RATIO}
LYMPHOCYTES # BLD AUTO: 1.25 K/UL (ref 1–4.8)
LYMPHOCYTES NFR BLD AUTO: 22.6 % (ref 27–41)
MCH RBC QN AUTO: 31.7 PG (ref 27–31)
MCHC RBC AUTO-ENTMCNC: 34.1 G/DL (ref 32–36)
MCV RBC AUTO: 92.9 FL (ref 80–96)
MICROALBUMIN UR-MCNC: <0.5 MG/DL
MICROALBUMIN/CREAT RATIO PNL UR: NORMAL
MONOCYTES # BLD AUTO: 0.48 K/UL (ref 0–0.8)
MONOCYTES NFR BLD AUTO: 8.7 % (ref 2–6)
MPC BLD CALC-MCNC: 10.1 FL (ref 9.4–12.4)
NEUTROPHILS # BLD AUTO: 3.54 K/UL (ref 1.8–7.7)
NEUTROPHILS NFR BLD AUTO: 63.8 % (ref 53–65)
NONHDLC SERPL-MCNC: 87 MG/DL
NRBC # BLD AUTO: 0 X10E3/UL
NRBC, AUTO (.00): 0 %
PLATELET # BLD AUTO: 182 K/UL (ref 150–400)
POTASSIUM SERPL-SCNC: 4.4 MMOL/L (ref 3.5–5.1)
PROT SERPL-MCNC: 7.1 G/DL (ref 5.8–7.6)
RBC # BLD AUTO: 5.37 M/UL (ref 4.6–6.2)
SODIUM SERPL-SCNC: 139 MMOL/L (ref 136–145)
TRIGL SERPL-MCNC: 86 MG/DL (ref 34–140)
TSH SERPL DL<=0.005 MIU/L-ACNC: 3.22 UIU/ML (ref 0.35–4.94)
VLDLC SERPL-MCNC: 17 MG/DL
WBC # BLD AUTO: 5.54 K/UL (ref 4.5–11)

## 2025-01-16 PROCEDURE — 82043 UR ALBUMIN QUANTITATIVE: CPT | Mod: ,,, | Performed by: CLINICAL MEDICAL LABORATORY

## 2025-01-16 PROCEDURE — 83036 HEMOGLOBIN GLYCOSYLATED A1C: CPT | Mod: ,,, | Performed by: CLINICAL MEDICAL LABORATORY

## 2025-01-16 PROCEDURE — 82570 ASSAY OF URINE CREATININE: CPT | Mod: ,,, | Performed by: CLINICAL MEDICAL LABORATORY

## 2025-01-16 PROCEDURE — 99214 OFFICE O/P EST MOD 30 MIN: CPT | Mod: ,,, | Performed by: NURSE PRACTITIONER

## 2025-01-16 PROCEDURE — 80050 GENERAL HEALTH PANEL: CPT | Mod: ,,, | Performed by: CLINICAL MEDICAL LABORATORY

## 2025-01-16 PROCEDURE — 80061 LIPID PANEL: CPT | Mod: ,,, | Performed by: CLINICAL MEDICAL LABORATORY

## 2025-01-16 RX ORDER — TAMSULOSIN HYDROCHLORIDE 0.4 MG/1
1 CAPSULE ORAL DAILY
Qty: 90 CAPSULE | Refills: 3 | Status: SHIPPED | OUTPATIENT
Start: 2025-01-16

## 2025-01-16 NOTE — ASSESSMENT & PLAN NOTE
Lab Results   Component Value Date    HGBA1C 7.2 (H) 09/16/2024    HGBA1C 7.0 (H) 05/14/2024    HGBA1C 6.9 (H) 01/09/2024     Continue Xigduo XR and glipizide.  Rarely checks glucose.  Denies hypoglycemia.

## 2025-01-16 NOTE — PROGRESS NOTES
[facility]  [address] [place] [address]  Phone: [phone]  Fax: [phone]       PATIENT NAME: Vishal Grace   : 1955    AGE: 69 y.o. DATE OF ENCOUNTER: 25    MRN: [id number]      PCP: Elaine Young FNP    Subjective:   CHANGE CHIEF COMPLAINT      :[id number]}274}  Chief Complaint   Patient presents with    Diabetes    Follow-up     Patient presents to clinic for 4 month follow up of DM.  Patient has not take flomax in a while but would like to start back.    Health Maintenance     Care gaps addressed.  Eye exam done by [facility].      History of Present Illness    HPI:  Patient reports intermittent urinary frequency, nocturia, and decreased urinary stream strength. He expresses a desire to restart Flomax due to these urinary symptoms.    Regarding diabetes management, the patient reports inconsistent medication adherence. He takes 2 Xigduo approximately every other day due to stomach upset.    Patient's last recorded A1C was 7.2%. He occasionally checks his blood sugar at home, reporting a reading of 180 after consuming high-sugar foods during [place], and readings in the 120s and teens at other times. He associates elevated blood sugar with weight gain.    Patient has a history of melanoma and is scheduled to see Dr. Xavier Trivedi, a dermatologist, on the  of the current month for his first dermatology visit.    Patient continues to see Dr. Dodge for management of his blood thinning medication (clopidogrel/Plavix) for history popliteal aneurysm. He is due for an eye exam and mentions previous appointments [facility].    Patient denies chest pain, shortness of breath, and need for or use of meloxicam.      ROS:  Respiratory: -shortness of breath  Cardiovascular: -chest pain  Genitourinary: +frequency, +nocturia         Allergies and Meds: 274}     Review of patient's allergies indicates:   Allergen Reactions     Pineapple Hives        Current Outpatient Medications   Medication Sig Dispense Refill    clopidogreL (PLAVIX) 75 mg tablet TAKE 1 TABLET BY MOUTH EVERY DAY (Patient taking differently: Take 75 mg by mouth once daily.) 90 tablet 3    dapaglifloz propaned-metformin (XIGDUO XR) 5-1,000 mg Take 2 tablets by mouth once daily. 90 tablet 3    glipiZIDE 5 MG TR24 Take 1 tablet (5 mg total) by mouth daily with breakfast. 90 tablet 3    lisinopriL (PRINIVIL,ZESTRIL) 20 MG tablet Take 1 tablet (20 mg total) by mouth once daily. 90 tablet 3    pravastatin (PRAVACHOL) 40 MG tablet Take 1 tablet (40 mg total) by mouth once daily. 90 tablet 3    tamsulosin (FLOMAX) 0.4 mg Cap Take 1 capsule (0.4 mg total) by mouth once daily. 90 capsule 3     No current facility-administered medications for this visit.       Labs:274}   I have reviewed labs below:    Lab Results   Component Value Date    WBC 6.70 01/09/2024    RBC 5.63 01/09/2024    HGB 17.7 01/09/2024    HCT 51.1 01/09/2024     01/09/2024     09/16/2024    K 3.7 09/16/2024     09/16/2024    CALCIUM 9.0 09/16/2024    GLU 75 09/16/2024    BUN 19 (H) 09/16/2024    CREATININE 1.10 09/16/2024    EGFRNONAA 65 01/11/2022    ALT 34 05/14/2024    AST 18 05/14/2024    CHOL 140 01/09/2024    TRIG 89 01/09/2024    HDL 44 01/09/2024    LDLCALC 78 01/09/2024    TSH 3.680 01/09/2024    PSA 3.070 05/14/2024    HGBA1C 7.2 (H) 09/16/2024    MICROALBUR 0.5 01/09/2024     Medical History: 274}     Past Medical History:   Diagnosis Date    COVID-19 08/20/2021    Deficiency of testosterone biosynthesis     Diabetes mellitus, type 2     Diabetic eye exam 12/13/2023    Dr. Victor Hugo An, OD - [facility]    Hypertension     Long term (current) use of oral hypoglycemic drugs     Other long term (current) drug therapy     Personal history of COVID-19 08/20/2021    Personal history of malignant melanoma of skin 1998    Personal history of malignant neoplasm of other parts  "of nervous tissue 04/2015    Referred to Dr. 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; had re-excision of the area 6/4/15 without residual tumor noted; had 30 radiation treatments between 8/4/15-9/15/2015      Social History     Tobacco Use   Smoking Status Never    Passive exposure: Never   Smokeless Tobacco Never      Past Surgical History:   Procedure Laterality Date    ANGIOGRAM, EXTREMITY, UNILATERAL Right 9/18/2023    Procedure: ANGIOGRAM, EXTREMITY, UNILATERAL;  Surgeon: Tonja Dodge MD;  Location: [facility];  Service: Vascular;  Laterality: Right;    ENDOVASCULAR REPAIR OF ANEURYSM Right 9/18/2023    Procedure: REPAIR, ANEURYSM, ENDOVASCULAR;  Surgeon: Tonja Dodge MD;  Location: [facility];  Service: Vascular;  Laterality: Right;  popliteal artery    EXCISION OF MELANOMA  1998    TUMOR EXCISION          Health Maintenance:      Health Maintenance Topics with due status: Not Due       Topic Last Completion Date    TETANUS VACCINE 05/25/2021    Colorectal Cancer Screening 04/21/2023    PROSTATE-SPECIFIC ANTIGEN 05/14/2024    Hemoglobin A1c 09/16/2024    Low Dose Statin 01/16/2025    Foot Exam 01/16/2025       Objective:  274}   Vital Signs  Temp: 98.5 °F (36.9 °C)  Temp Source: Oral  Pulse: 78  Resp: 18  SpO2: 96 %  BP: 97/66  BP Location: Left arm  Patient Position: Sitting  Pain Score: 0-No pain  Height and Weight  Height: 5' 6" (167.6 cm)  Weight: 79.8 kg (176 lb)  BSA (Calculated - sq m): 1.93 sq meters  BMI (Calculated): 28.4  Weight in (lb) to have BMI = 25: 154.6    Over the last two weeks how often have you been bothered by little interest or pleasure in doing things: 0  Over the last two weeks how often have you been bothered by feeling down, depressed or hopeless: 0  PHQ-2 Total Score: 0    Wt Readings from Last 3 Encounters:   01/16/25 79.8 kg (176 lb)   10/02/24 81.6 kg (179 lb " 14.3 oz)   09/16/24 81.6 kg (180 lb)     Physical Exam  Vitals and nursing note reviewed.   Constitutional:       General: He is not in acute distress.     Appearance: Normal appearance. He is not ill-appearing.   HENT:      Head: Normocephalic.   Eyes:      Conjunctiva/sclera: Conjunctivae normal.      Pupils: Pupils are equal, round, and reactive to light.   Neck:      Thyroid: No thyromegaly.      Vascular: No carotid bruit.      Trachea: Trachea normal.   Cardiovascular:      Rate and Rhythm: Normal rate and regular rhythm.      Pulses:           Dorsalis pedis pulses are 3+ on the right side and 3+ on the left side.        Posterior tibial pulses are 3+ on the right side and 3+ on the left side.      Heart sounds: Normal heart sounds.   Pulmonary:      Effort: Pulmonary effort is normal. No respiratory distress.      Breath sounds: Normal breath sounds. No wheezing, rhonchi or rales.   Musculoskeletal:      Cervical back: Neck supple.      Right lower leg: No edema.      Left lower leg: No edema.      Right foot: Normal range of motion. No deformity or bunion.      Left foot: Normal range of motion. No deformity or bunion.   Feet:      Right foot:      Protective Sensation: 6 sites tested.  6 sites sensed.      Skin integrity: Dry skin present. No ulcer, blister, erythema, warmth, callus or fissure.      Toenail Condition: Right toenails are abnormally thick. Fungal disease present.     Left foot:      Protective Sensation: 6 sites tested.  6 sites sensed.      Skin integrity: Dry skin present. No ulcer, blister, erythema, warmth, callus or fissure.      Toenail Condition: Left toenails are normal.   Lymphadenopathy:      Cervical: No cervical adenopathy.      Upper Body:      Right upper body: No supraclavicular adenopathy.      Left upper body: No supraclavicular adenopathy.   Skin:     General: Skin is warm and dry.      Coloration: Skin is not jaundiced or pale.      Findings: No rash.   Neurological:       General: No focal deficit present.      Mental Status: He is alert and oriented to person, place, and time.      Gait: Gait normal.   Psychiatric:         Mood and Affect: Mood normal.         Behavior: Behavior normal.          Assessment and Plan: 274}       1. Type 2 diabetes mellitus with hyperglycemia, without long-term current use of insulin  Assessment & Plan:  Lab Results   Component Value Date    HGBA1C 7.2 (H) 09/16/2024    HGBA1C 7.0 (H) 05/14/2024    HGBA1C 6.9 (H) 01/09/2024     Continue Xigduo XR and glipizide.  Rarely checks glucose.  Denies hypoglycemia.     Orders:  -     Comprehensive Metabolic Panel; Future; Expected date: 01/16/2025  -     Microalbumin/Creatinine Ratio, Urine; Future; Expected date: 01/16/2025  -     Hemoglobin A1C; Future; Expected date: 01/16/2025  -     Ambulatory referral/consult to Optometry; Future; Expected date: 01/16/2025    2. Benign essential hypertension  -     Comprehensive Metabolic Panel; Future; Expected date: 01/16/2025    3. Other hyperlipidemia  -     Comprehensive Metabolic Panel; Future; Expected date: 01/16/2025  -     Lipid Panel; Future; Expected date: 01/16/2025    4. Other long term (current) drug therapy  -     CBC Auto Differential; Future; Expected date: 01/16/2025  -     Comprehensive Metabolic Panel; Future; Expected date: 01/16/2025  -     TSH; Future; Expected date: 01/16/2025    5. Benign prostatic hyperplasia with weak urinary stream  -     tamsulosin (FLOMAX) 0.4 mg Cap; Take 1 capsule (0.4 mg total) by mouth once daily.  Dispense: 90 capsule; Refill: 3        Assessment & Plan    IMPRESSION:  - Evaluated diabetes management; A1C increased to 7.2%  - Assessed urinary symptoms; decided to restart Flomax for nocturia  - Reviewed medication adherence; patient reports intermittent use of Xigduo due to GI upset  - Considered GLP-1 agonists previously, but patient experienced significant side effects  - Monitored weight; patient down 4 lbs since last  visit  - Assessed blood pressure control; current reading 97/66, continued lisinopril 20mg  - Reviewed cholesterol management; continued pravastatin 40mg  - Removed meloxicam from medication list due to non-use  - Noted importance of regular dermatology follow-up due to history of melanoma    DIABETES:  - Discussed potential blood sugar fluctuations related to holiday eating habits.  - Instructed the patient to monitor blood sugar periodically.  - Advised the patient to continue foot care, including soaking to prevent toenail splitting.  - Continued Glipizide and Xigduo XR for diabetes management.  - Ordered A1C test for updated diabetes management.  - Referred the patient for an updated eye exam.  - Noted the patient's last A1C was 7.2%.  - Documented that GLP-1 medications were not tolerated due to nausea, vomiting, and belching.  - Noted that injectable medications were not preferred by the patient.    HYPERTENSION:  - Continued lisinopril 20mg for blood pressure management.  - Monitored the patient's blood pressure as part of routine follow-up.  - Recorded blood pressure at 97/66, which is described as excellent.    MELANOMA HISTORY:  - Emphasized the importance of regular dermatology follow-ups for melanoma history.  - Noted the patient has an upcoming appointment with Dr. Xavier Trivedi, a dermatologist, on the 16th.    BENIGN PROSTATIC HYPERPLASIA:  - Restarted Flomax for urinary frequency.  - Noted the patient reports nocturia and weak urine stream.    MEDICATIONS/SUPPLEMENTS:  - Continued pravastatin 40mg for cholesterol management.  - Discontinued meloxicam due to no longer requiring.    LABS:  - Ordered lab panel and will notify of results next week.    FOLLOW UP:  - Instructed the patient to contact the office if experiencing any new symptoms or concerns.      Follow up in about 4 months (around 5/16/2025) for T2DM, hypertension.    Signature:  ANABELLA Cruz-BC    Future Appointments   Date Time  Provider Department Center   1/21/2025  3:00 PM Vanesa Trivedi MD [facility] [place]   5/19/2025  3:00 PM Elaine Young FNP [facility] [place]   10/2/2025  2:30 PM [facility] US[facility] VASC[facility]   10/2/2025  3:00 PM Tonja Dodge MD [facility] VSCSR[facility]     This note was generated with the assistance of ambient listening technology. Verbal consent was obtained by the patient and accompanying visitor(s) for the recording of patient appointment to facilitate this note. I attest to having reviewed and edited the generated note for accuracy, though some syntax or spelling errors may persist. Please contact the author of this note for any clarification.    Reviewed on 01/28/2025 by Byron Alejo MD

## 2025-01-17 NOTE — PROGRESS NOTES
Call patient and review results.  T2DM control is not good at all with glucose 167 and A1c up to 8%.  He needs to get more consistent with taking Xigduo XR 2 tablets daily which he could split and take 1 tablet twice a day with meals if better able to tolerate and if unable to tolerate needs to let us know so that we can increase glipizide dosage.  Glipizide is more likely to cause hypoglycemia so I would prefer trying the other 1st.  Otherwise labs are good.  F/u 3-mth for uncontrolled T2DM as planned.

## 2025-01-23 NOTE — PROGRESS NOTES
Patient notified of lab results and recommendations.   Instructed patient he could take the Xigduo 1 tablet twice a day with meals.  Will keep 3 month follow up

## 2025-01-27 LAB
LEFT EYE DM RETINOPATHY: NEGATIVE
RIGHT EYE DM RETINOPATHY: NEGATIVE

## 2025-01-30 ENCOUNTER — OFFICE VISIT (OUTPATIENT)
Dept: DERMATOLOGY | Facility: CLINIC | Age: 70
End: 2025-01-30
Payer: COMMERCIAL

## 2025-01-30 DIAGNOSIS — L57.0 ACTINIC KERATOSES: Primary | ICD-10-CM

## 2025-01-30 DIAGNOSIS — L82.0 INFLAMED SEBORRHEIC KERATOSIS: ICD-10-CM

## 2025-01-30 DIAGNOSIS — D22.9 MULTIPLE BENIGN NEVI: ICD-10-CM

## 2025-01-30 DIAGNOSIS — Z85.820 HISTORY OF MELANOMA: ICD-10-CM

## 2025-01-30 PROCEDURE — 17110 DESTRUCTION B9 LES UP TO 14: CPT | Mod: ,,, | Performed by: STUDENT IN AN ORGANIZED HEALTH CARE EDUCATION/TRAINING PROGRAM

## 2025-01-30 PROCEDURE — 17003 DESTRUCT PREMALG LES 2-14: CPT | Mod: XS,,, | Performed by: STUDENT IN AN ORGANIZED HEALTH CARE EDUCATION/TRAINING PROGRAM

## 2025-01-30 PROCEDURE — 99203 OFFICE O/P NEW LOW 30 MIN: CPT | Mod: 25,,, | Performed by: STUDENT IN AN ORGANIZED HEALTH CARE EDUCATION/TRAINING PROGRAM

## 2025-01-30 PROCEDURE — 17000 DESTRUCT PREMALG LESION: CPT | Mod: XS,,, | Performed by: STUDENT IN AN ORGANIZED HEALTH CARE EDUCATION/TRAINING PROGRAM

## 2025-01-30 NOTE — PROGRESS NOTES
[facility]  Xavier Trivedi MD    [address] [place] [address]  [phone]    Fax: [phone]    Patient Name: Vishal Grace  Medical Record Number: [id number]  PCP: Elaine Young FNP  Age: 69 y.o. : 1955  Contact: [phone] (home)     CC: lesion on the back  History of Present Illness:     Vishal Grace is a 69 y.o.  male with  history of skin cancer (melanoma on R leg s/p excision with Dr. Chavez in ) who presents to clinic today for lesion on the back. It has been present 2 years. Symptoms include scaly and irritation.       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   The patient has no other concerns today.    Review of Systems:     Unremarkable other than mentioned above.     Physical Exam:     General: Relaxed, oriented, alert    Skin examination of the scalp, face, neck, chest, back, abdomen, upper extremities and lower extremities were normal except for as listed below      Assessment and Plan:     1. History of malignant melanoma  - well healed scar with NER    Plan: Counseling  Patients with a history of melanoma should wear broad spectrum sunscreen and sun protective clothing.  Ecars from excisional sites of melanoma should be monitored for any recurrences. Monthly self-skin checks should be performed to monitor for any moles that change in size, shape or color, itch burn or bleed.  Contact Office if: Patient notices any new or changing moles, develops constitutional symptoms or develops new lesions within or around the previous melanoma scar.      2. Actinic Keratoses  Erythematous, scaly papules on forehead, scalp    Plan: Liquid Nitrogen.  A total of 4 lesions were treated with liquid nitrogen for 2 freeze-thaw cycles lasting 5 seconds, located on the above locations.   The patient's consent was obtained including but not limited to risks of crusting, scabbing,  blistering,  scarring, darker or lighter pigmentary change, recurrence, incomplete removal and infection.    Counseling.  Sun protective clothing and broad spectrum sunscreen can prevent the formation of AK.   AKs can be treated with cryotherapy, photodynamic therapy, imiquimod, topical 5-FU.  Actinic Keratoses are precancerous proliferations that occur within sun damaged skin. If untreated,  a small subset of AKs can develop into Squamous Cell Carcinoma.      3. Benign Nevus   - Dome shaped regular papule    Plan: Reassurance.    Counseling.  Monthly self-skin checks to monitor for any changes in moles are recommended.  Contact Office if: Any moles change in size, shape or color; itch, burn or bleed.  Discussed use of sunscreen SPF 30 or great     4. Irritated Seborrheic Keratoses (L82.0)  Stuck-on inflamed papules with crust located on the back  Associated diagnoses: Pruritus and Cutaneous Inflammation    Plan: Liquid Nitrogen.  A total of 1 lesions were treated with liquid nitrogen, located on the above listed location.  This procedure was medically necessary because the lesions that were treated were: irritated and itchy. The  patient's consent was obtained including but not limited to risks of crusting, scabbing, blistering, scarring, darker  or lighter pigmentary change, recurrence, incomplete removal and infection.    5. History of malignant peripheral nerve sheath tumor     - no e/o recurrence   Xavier Trivedi MD   Mohs Surgery/Dermatologic Oncology  Dermatology

## 2025-02-20 DIAGNOSIS — I72.4 POPLITEAL ANEURYSM: ICD-10-CM

## 2025-02-24 RX ORDER — CLOPIDOGREL BISULFATE 75 MG/1
75 TABLET ORAL DAILY
Qty: 30 TABLET | Refills: 11 | Status: SHIPPED | OUTPATIENT
Start: 2025-02-24

## 2025-05-19 ENCOUNTER — PATIENT OUTREACH (OUTPATIENT)
Facility: HOSPITAL | Age: 70
End: 2025-05-19
Payer: COMMERCIAL

## 2025-05-19 ENCOUNTER — OFFICE VISIT (OUTPATIENT)
Dept: FAMILY MEDICINE | Facility: CLINIC | Age: 70
End: 2025-05-19
Payer: COMMERCIAL

## 2025-05-19 VITALS
TEMPERATURE: 98 F | BODY MASS INDEX: 27.48 KG/M2 | RESPIRATION RATE: 18 BRPM | HEART RATE: 98 BPM | DIASTOLIC BLOOD PRESSURE: 79 MMHG | WEIGHT: 171 LBS | OXYGEN SATURATION: 95 % | HEIGHT: 66 IN | SYSTOLIC BLOOD PRESSURE: 113 MMHG

## 2025-05-19 DIAGNOSIS — Z12.5 PROSTATE CANCER SCREENING: ICD-10-CM

## 2025-05-19 DIAGNOSIS — I10 BENIGN ESSENTIAL HYPERTENSION: Chronic | ICD-10-CM

## 2025-05-19 DIAGNOSIS — E11.65 TYPE 2 DIABETES MELLITUS WITH HYPERGLYCEMIA, WITHOUT LONG-TERM CURRENT USE OF INSULIN: Primary | Chronic | ICD-10-CM

## 2025-05-19 DIAGNOSIS — Z85.820 PERSONAL HISTORY OF MALIGNANT MELANOMA OF SKIN: ICD-10-CM

## 2025-05-19 LAB
ANION GAP SERPL CALCULATED.3IONS-SCNC: 12 MMOL/L (ref 7–16)
BUN SERPL-MCNC: 22 MG/DL (ref 8–26)
BUN/CREAT SERPL: 16 (ref 6–20)
CALCIUM SERPL-MCNC: 9.1 MG/DL (ref 8.8–10)
CHLORIDE SERPL-SCNC: 111 MMOL/L (ref 98–107)
CO2 SERPL-SCNC: 18 MMOL/L (ref 23–31)
CREAT SERPL-MCNC: 1.39 MG/DL (ref 0.72–1.25)
EGFR (NO RACE VARIABLE) (RUSH/TITUS): 55 ML/MIN/1.73M2
EST. AVERAGE GLUCOSE BLD GHB EST-MCNC: 169 MG/DL
GLUCOSE SERPL-MCNC: 167 MG/DL (ref 82–115)
HBA1C MFR BLD HPLC: 7.5 %
POTASSIUM SERPL-SCNC: 3.5 MMOL/L (ref 3.5–5.1)
PSA SERPL-MCNC: 2.37 NG/ML
SODIUM SERPL-SCNC: 137 MMOL/L (ref 136–145)

## 2025-05-19 PROCEDURE — 80048 BASIC METABOLIC PNL TOTAL CA: CPT | Mod: ,,, | Performed by: CLINICAL MEDICAL LABORATORY

## 2025-05-19 PROCEDURE — 83036 HEMOGLOBIN GLYCOSYLATED A1C: CPT | Mod: ,,, | Performed by: CLINICAL MEDICAL LABORATORY

## 2025-05-19 PROCEDURE — G0103 PSA SCREENING: HCPCS | Mod: ,,, | Performed by: CLINICAL MEDICAL LABORATORY

## 2025-05-19 PROCEDURE — 99214 OFFICE O/P EST MOD 30 MIN: CPT | Mod: ,,, | Performed by: NURSE PRACTITIONER

## 2025-05-19 NOTE — ASSESSMENT & PLAN NOTE
Lab Results   Component Value Date    HGBA1C 8.0 (H) 01/16/2025    HGBA1C 7.2 (H) 09/16/2024    HGBA1C 7.0 (H) 05/14/2024     Not controlled  Continue Xigduo XR and glipizide.  Rarely checks glucose.  Denies hypoglycemia.

## 2025-05-19 NOTE — PROGRESS NOTES
[facility]  [address] [place] [address]  Phone: [phone]  Fax: [phone]       PATIENT NAME: Vishal Grace   : 1955    AGE: 69 y.o. DATE OF ENCOUNTER: 25    MRN: [id number]      PCP: Elaine Young FNP    Subjective:   CHANGE CHIEF COMPLAINT      :[id number]}274}  Chief Complaint   Patient presents with    Diabetes     Patient reports to the clinic today for his 4 month follow up.    Health Maintenance     Care gaps addressed, patient had eye exam at [facility] earlier this year, request sent to Ashley.    Rowena Gama CMA     History of Present Illness    HPI:  Patient's last A1C increased from 7.2% to 8% in January of this year. He is currently taking Xigduo XR 1000, 2 tablets daily, and glipizide 5 mg daily with breakfast. He previously discontinued Rybelsus due to severe nausea with vomiting and belching. He was resistant to trying injectable forms of GLP-1 such as Ozempic, Trulicity, Victoza, or Mounjaro. He has lost about 5 lbs since his last visit.    He has a history of a right popliteal aneurysm and is followed by Dr. Talon Dodge and Melissa Godfrey [facility]. He is maintained on Plavix with a patent right popliteal artery stent, last checked in 2024. A yearly follow-up is scheduled for 2025, with an ultrasound of the right popliteal artery planned before the appointment.    He reports irregular blood sugar monitoring, with his last check being 2-3 weeks ago. His blood sugar readings were 115, 118, and occasionally 97-98. He is attempting to increase his water intake and reports some shortness of breath.    He was recently evaluated by a dermatologist, Dr. Xavier Trivedi, due to his history of melanoma. Two small pre-cancerous lesions were removed from his face, and a mole on his back was examined and removed. A follow-up visit with the dermatologist is scheduled for next February.    He denies any chest pain or low blood  sugar episodes.      ROS:  Respiratory: +exertional dyspnea  Cardiovascular: -chest pain, +exercise intolerance  Integumentary: +abnormal moles, +skin lesion         Allergies and Meds: 274}     Review of patient's allergies indicates:   Allergen Reactions    Pineapple Hives        Current Outpatient Medications   Medication Sig Dispense Refill    clopidogreL (PLAVIX) 75 mg tablet Take 1 tablet (75 mg total) by mouth once daily. 30 tablet 11    dapaglifloz propaned-metformin (XIGDUO XR) 5-1,000 mg Take 2 tablets by mouth once daily. 90 tablet 3    glipiZIDE 5 MG TR24 Take 1 tablet (5 mg total) by mouth daily with breakfast. 90 tablet 3    lisinopriL (PRINIVIL,ZESTRIL) 20 MG tablet Take 1 tablet (20 mg total) by mouth once daily. 90 tablet 3    pravastatin (PRAVACHOL) 40 MG tablet Take 1 tablet (40 mg total) by mouth once daily. 90 tablet 3    tamsulosin (FLOMAX) 0.4 mg Cap Take 1 capsule (0.4 mg total) by mouth once daily. 90 capsule 3     No current facility-administered medications for this visit.       Labs:274}   I have reviewed labs below:    Lab Results   Component Value Date    WBC 5.54 01/16/2025    RBC 5.37 01/16/2025    HGB 17.0 01/16/2025    HCT 49.9 01/16/2025     01/16/2025     01/16/2025    K 4.4 01/16/2025     (H) 01/16/2025    CALCIUM 8.8 01/16/2025     (H) 01/16/2025    BUN 27 (H) 01/16/2025    CREATININE 1.24 01/16/2025    EGFRNONAA 65 01/11/2022    ALT 27 01/16/2025    AST 22 01/16/2025    CHOL 127 01/16/2025    TRIG 86 01/16/2025    HDL 40 01/16/2025    LDLCALC 70 01/16/2025    TSH 3.218 01/16/2025    PSA 3.070 05/14/2024    HGBA1C 8.0 (H) 01/16/2025    MICROALBUR <0.5 01/16/2025       Medical History: 274}     Past Medical History:   Diagnosis Date    COVID-19 08/20/2021    Deficiency of testosterone biosynthesis     Diabetes mellitus, type 2     Diabetic eye exam 12/13/2023    Dr. Victor Hugo An, OD - [facility]    Hypertension     Long term (current) use  "of oral hypoglycemic drugs     Other long term (current) drug therapy     Personal history of COVID-19 08/20/2021    Personal history of malignant melanoma of skin 1998    Personal history of malignant neoplasm of other parts of nervous tissue 04/2015    Referred to Dr. 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; had re-excision of the area 6/4/15 without residual tumor noted; had 30 radiation treatments between 8/4/15-9/15/2015      Tobacco Use History[1]   Past Surgical History:   Procedure Laterality Date    ANGIOGRAM, EXTREMITY, UNILATERAL Right 9/18/2023    Procedure: ANGIOGRAM, EXTREMITY, UNILATERAL;  Surgeon: Tonja Dodge MD;  Location: [facility];  Service: Vascular;  Laterality: Right;    ENDOVASCULAR REPAIR OF ANEURYSM Right 9/18/2023    Procedure: REPAIR, ANEURYSM, ENDOVASCULAR;  Surgeon: Tonja Dodge MD;  Location: [facility] OR;  Service: Vascular;  Laterality: Right;  popliteal artery    EXCISION OF MELANOMA  1998    TUMOR EXCISION          Health Maintenance:      Health Maintenance Topics with due status: Not Due       Topic Last Completion Date    TETANUS VACCINE 05/25/2021    Colorectal Cancer Screening 04/21/2023    Low Dose Statin 01/16/2025    Diabetes Urine Screening 01/16/2025    Foot Exam 01/16/2025    Lipid Panel 01/16/2025       Objective:  274}   Vital Signs  Temp: 97.8 °F (36.6 °C)  Temp Source: Oral  Pulse: 98  Resp: 18  SpO2: 95 %  BP: 113/79  BP Location: Left arm  Patient Position: Sitting  Pain Score: 0-No pain  Height and Weight  Height: 5' 6" (167.6 cm)  Weight: 77.6 kg (171 lb)  BSA (Calculated - sq m): 1.9 sq meters  BMI (Calculated): 27.6  Weight in (lb) to have BMI = 25: 154.6    Over the last two weeks how often have you been bothered by little interest or pleasure in doing things: 0  Over the last two weeks how often have you been bothered by feeling down, " depressed or hopeless: 0  PHQ-2 Total Score: 0    Wt Readings from Last 3 Encounters:   05/19/25 77.6 kg (171 lb)   01/16/25 79.8 kg (176 lb)   10/02/24 81.6 kg (179 lb 14.3 oz)     Physical Exam  Vitals and nursing note reviewed.   Constitutional:       General: He is not in acute distress.     Appearance: Normal appearance. He is not ill-appearing.   HENT:      Head: Normocephalic.   Eyes:      Conjunctiva/sclera: Conjunctivae normal.   Neck:      Trachea: Trachea normal.   Cardiovascular:      Rate and Rhythm: Normal rate and regular rhythm.      Pulses: Normal pulses.      Heart sounds: Normal heart sounds.   Pulmonary:      Effort: Pulmonary effort is normal. No respiratory distress.      Breath sounds: Normal breath sounds. No wheezing, rhonchi or rales.   Musculoskeletal:      Cervical back: Neck supple.      Right lower leg: No edema.      Left lower leg: No edema.   Lymphadenopathy:      Cervical: No cervical adenopathy.   Skin:     General: Skin is warm and dry.      Coloration: Skin is not jaundiced or pale.   Neurological:      Mental Status: He is alert and oriented to person, place, and time.      Gait: Gait normal.   Psychiatric:         Mood and Affect: Mood normal.         Behavior: Behavior normal.          Assessment and Plan: 274}       1. Type 2 diabetes mellitus with hyperglycemia, without long-term current use of insulin  Assessment & Plan:  Lab Results   Component Value Date    HGBA1C 8.0 (H) 01/16/2025    HGBA1C 7.2 (H) 09/16/2024    HGBA1C 7.0 (H) 05/14/2024     Not controlled  Continue Xigduo XR and glipizide.  Rarely checks glucose.  Denies hypoglycemia.     Orders:  -     Basic Metabolic Panel; Future; Expected date: 05/19/2025  -     Hemoglobin A1C; Future; Expected date: 05/19/2025    2. Benign essential hypertension  Assessment & Plan:  Controlled, continue lisinopril 20 mg daily.       3. Prostate cancer screening  -     PSA, Screening; Future; Expected date: 05/19/2025    4.  Personal history of malignant melanoma of skin  Assessment & Plan:  Yearly monitoring with Dermatology.          Assessment & Plan    IMPRESSION:  - Assessed uncontrolled diabetes with A1C increase from 7.2% to 8% in January.  - Noted 5-pound weight loss since last visit, potentially improving A1C.  - Monitored right popliteal artery stent, last checked October 2024.  - Acknowledged recent dermatology visit with Dr. Xavier Trivedi for melanoma history.    TYPE 2 DIABETES MELLITUS:  - Monitored the patient's A1C which increased from 7.2% to 8% in January.  - Patient lost approximately 5 lbs since last visit.  - Blood sugar levels occasionally between 97 and 118, and blood pressure at 113/79, which is within normal range.  - Continued Xigduo XR 5/1,000, 2 tablets daily and glipizide 5 mg daily with breakfast.  - Discontinued Rybelsus due to severe nausea with vomiting and belching.  - Patient expressed resistance to injectable GLP-1 forms including Ozempic, Trulicity, Victoza, or Mounjaro.  - Educated patient on drawbacks of zero sugar drinks containing artificial sweeteners and advised limiting soda intake to 1 per day, emphasizing water as primary beverage.  - Encouraged continued efforts in weight loss, improved eating habits, and increasing water intake.  - Ordered A1C test and basic metabolic panel.    HISTORY OF MALIGNANT MELANOMA AND ACTINIC KERATOSIS:  - Noted patient visited dermatologist Dr. Xavier Trivedi for melanoma history.  - Dermatologist removed 2 pre-cancerous lesions from face and examined mole on back.  - Scheduled yearly follow-up with Dr. Trivedi in February for annual dermatology appointment.    PATIENT NONCOMPLIANCE:  - Discussed patient's noncompliance with regular glucose monitoring.    FOLLOW-UP:  - Ordered PSA test for prostate cancer screening.       Follow up in about 4 months (around 9/19/2025) for T2DM, with nonfasting lab.    Signature:  ANABELLA Cruz-BC    Future Appointments   Date  Time Provider Department [place]   9/17/2025  1:00 PM Elaine Young FNP [facility]   10/2/2025  2:30 PM [facility] US[facility]   10/2/2025  3:00 PM Tonja Dodge MD [facility]   2/3/2026  3:00 PM [facility]     This note was generated with the assistance of ambient listening technology. Verbal consent was obtained by the patient and accompanying visitor(s) for the recording of patient appointment to facilitate this note. I attest to having reviewed and edited the generated note for accuracy, though some syntax or spelling errors may persist. Please contact the author of this note for any clarification.           [1]   Social History  Tobacco Use   Smoking Status Never    Passive exposure: Never   Smokeless Tobacco Never

## 2025-05-19 NOTE — PROGRESS NOTES
Population Health Chart Review & Patient Outreach Details    Health Maintenance Topics Addressed and Outreach Outcomes / Actions Taken:  Diabetic Eye Exam [x] SHAYY sent to [facility].

## 2025-05-19 NOTE — LETTER
AUTHORIZATION FOR RELEASE OF   CONFIDENTIAL INFORMATION    Dear Dr. An,    We are seeing Vishal Grace, date of birth 1955, in the clinic at [facility]. Elaine Young FNP is the patient's PCP. Vishal Grace has an outstanding lab/procedure at the time we reviewed his chart. In order to help keep his health information updated, he has authorized us to request the following medical record(s):        (  )  MAMMOGRAM                                      (  )  COLONOSCOPY      (  )  PAP SMEAR                                          (  )  OUTSIDE LAB RESULTS     (  )  DEXA SCAN                                          ( x )  EYE EXAM            (  )  FOOT EXAM                                          (  )  ENTIRE RECORD     (  )  OUTSIDE IMMUNIZATIONS                 (  )  _______________         Please fax records to Ashley An LPN Care Coordinator at [phone].      If you have any questions, please contact Ashley at [phone].           Patient Name: Vishal Grace  : 1955  Patient Phone #: [phone]

## 2025-05-20 ENCOUNTER — RESULTS FOLLOW-UP (OUTPATIENT)
Dept: FAMILY MEDICINE | Facility: CLINIC | Age: 70
End: 2025-05-20

## 2025-05-20 DIAGNOSIS — E11.65 TYPE 2 DIABETES MELLITUS WITH HYPERGLYCEMIA, WITHOUT LONG-TERM CURRENT USE OF INSULIN: Chronic | ICD-10-CM

## 2025-05-20 RX ORDER — GLIPIZIDE 5 MG/1
10 TABLET, FILM COATED, EXTENDED RELEASE ORAL
Qty: 180 TABLET | Refills: 3 | Status: SHIPPED | OUTPATIENT
Start: 2025-05-20

## 2025-05-20 NOTE — PROGRESS NOTES
Call patient and review results.  Creatinine and GFR remain in stage 3a CKD range.  Very slight have point improvement in A1c, glucose still too high.  Increase glipizide to 10 mg every morning with breakfast.  Let me know if he has any hypoglycemia prior to f/u.  Normal PSA screen.

## 2025-05-27 ENCOUNTER — PATIENT OUTREACH (OUTPATIENT)
Facility: HOSPITAL | Age: 70
End: 2025-05-27
Payer: COMMERCIAL

## 2025-05-27 NOTE — PROGRESS NOTES
Population Health Chart Review & Patient Outreach Details    Health Maintenance Topics Addressed and Outreach Outcomes / Actions Taken:  Diabetic Eye Exam [x] HM Updated with January 2025 Eye Exam (Dr. An). History Updated.

## 2025-06-02 DIAGNOSIS — E11.65 TYPE 2 DIABETES MELLITUS WITH HYPERGLYCEMIA, WITHOUT LONG-TERM CURRENT USE OF INSULIN: Chronic | ICD-10-CM

## 2025-06-02 RX ORDER — DAPAGLIFLOZIN AND METFORMIN HYDROCHLORIDE 5; 1000 MG/1; MG/1
2 TABLET, FILM COATED, EXTENDED RELEASE ORAL DAILY
Qty: 180 TABLET | Refills: 3 | Status: SHIPPED | OUTPATIENT
Start: 2025-06-02

## 2025-06-26 ENCOUNTER — CLINICAL SUPPORT (OUTPATIENT)
Dept: PRIMARY CARE CLINIC | Facility: CLINIC | Age: 70
End: 2025-06-26

## 2025-06-26 DIAGNOSIS — Z02.4 ENCOUNTER FOR DEPARTMENT OF TRANSPORTATION (DOT) EXAMINATION FOR DRIVING LICENSE RENEWAL: Primary | ICD-10-CM

## 2025-06-26 NOTE — PROGRESS NOTES
Patient ID: Vishal Grace is a 69 y.o. male.    Chief Complaint: No chief complaint on file.    History of Present Illness              Physical Exam              Assessment & Plan               1. Encounter for Department of Transportation (DOT) examination for driving license renewal        No follow-ups on file.    This note was generated with the assistance of ambient listening technology. Verbal consent was obtained by the patient and accompanying visitor(s) for the recording of patient appointment to facilitate this note. I attest to having reviewed and edited the generated note for accuracy, though some syntax or spelling errors may persist. Please contact the author of this note for any clarification.

## (undated) DEVICE — SOL NACL IRR 1000ML BTL

## (undated) DEVICE — Device

## (undated) DEVICE — INTRODUCER PINNACLE 7FRX45CM

## (undated) DEVICE — GOWN NONREINF SET-IN SLV 2XL

## (undated) DEVICE — COVER SNAP KAP 26IN

## (undated) DEVICE — GLOVE 6.5 PROTEXIS PI BLUE

## (undated) DEVICE — DEVICE INFLATION BASIX 25

## (undated) DEVICE — CATH QUICK-CROSS .035X15

## (undated) DEVICE — GLOVE PROTEXIS PI SYN SURG 6.5

## (undated) DEVICE — GUIDEWIRE COMMAND .014X300CM

## (undated) DEVICE — GLOVE PROTEXIS PI SYN SURG 7.5

## (undated) DEVICE — SPONGE COTTON TRAY 4X4IN

## (undated) DEVICE — GUIDEWIRE AQUATRACK REG 260CM

## (undated) DEVICE — TAPE GLOW'N TELL 55CM STL BX/20

## (undated) DEVICE — SHEATH BRITE TIP INTRO 11CM 5F